# Patient Record
Sex: FEMALE | Race: WHITE | NOT HISPANIC OR LATINO
[De-identification: names, ages, dates, MRNs, and addresses within clinical notes are randomized per-mention and may not be internally consistent; named-entity substitution may affect disease eponyms.]

---

## 2019-09-18 ENCOUNTER — APPOINTMENT (OUTPATIENT)
Dept: ULTRASOUND IMAGING | Facility: IMAGING CENTER | Age: 69
End: 2019-09-18

## 2019-09-24 ENCOUNTER — APPOINTMENT (OUTPATIENT)
Dept: ULTRASOUND IMAGING | Facility: IMAGING CENTER | Age: 69
End: 2019-09-24
Payer: MEDICARE

## 2019-09-24 ENCOUNTER — OUTPATIENT (OUTPATIENT)
Dept: OUTPATIENT SERVICES | Facility: HOSPITAL | Age: 69
LOS: 1 days | End: 2019-09-24
Payer: MEDICARE

## 2019-09-24 DIAGNOSIS — Z00.8 ENCOUNTER FOR OTHER GENERAL EXAMINATION: ICD-10-CM

## 2019-09-24 PROCEDURE — 76536 US EXAM OF HEAD AND NECK: CPT | Mod: 26

## 2019-09-24 PROCEDURE — 76536 US EXAM OF HEAD AND NECK: CPT

## 2021-07-22 ENCOUNTER — APPOINTMENT (OUTPATIENT)
Dept: OTOLARYNGOLOGY | Facility: CLINIC | Age: 71
End: 2021-07-22
Payer: MEDICARE

## 2021-07-22 VITALS
HEART RATE: 79 BPM | HEIGHT: 62 IN | DIASTOLIC BLOOD PRESSURE: 78 MMHG | SYSTOLIC BLOOD PRESSURE: 147 MMHG | WEIGHT: 165 LBS | BODY MASS INDEX: 30.36 KG/M2

## 2021-07-22 DIAGNOSIS — Z86.39 PERSONAL HISTORY OF OTHER ENDOCRINE, NUTRITIONAL AND METABOLIC DISEASE: ICD-10-CM

## 2021-07-22 PROCEDURE — 99204 OFFICE O/P NEW MOD 45 MIN: CPT

## 2021-07-22 RX ORDER — SITAGLIPTIN 100 MG/1
TABLET, FILM COATED ORAL
Refills: 0 | Status: ACTIVE | COMMUNITY

## 2021-07-22 RX ORDER — METOPROLOL SUCCINATE 100 MG/1
TABLET, EXTENDED RELEASE ORAL
Refills: 0 | Status: ACTIVE | COMMUNITY

## 2021-07-22 RX ORDER — LISINOPRIL 30 MG/1
TABLET ORAL
Refills: 0 | Status: ACTIVE | COMMUNITY

## 2021-07-22 RX ORDER — METFORMIN HYDROCHLORIDE 625 MG/1
TABLET ORAL
Refills: 0 | Status: ACTIVE | COMMUNITY

## 2021-07-22 RX ORDER — ASPIRIN 325 MG/1
TABLET, FILM COATED ORAL
Refills: 0 | Status: ACTIVE | COMMUNITY

## 2021-07-22 RX ORDER — ROSUVASTATIN CALCIUM 5 MG/1
TABLET, FILM COATED ORAL
Refills: 0 | Status: ACTIVE | COMMUNITY

## 2021-07-22 RX ORDER — CHOLECALCIFEROL (VITAMIN D3) 25 MCG
TABLET ORAL
Refills: 0 | Status: ACTIVE | COMMUNITY

## 2021-07-22 NOTE — REASON FOR VISIT
[Initial Evaluation] : an initial evaluation for [Family Member] : family member [FreeTextEntry2] : referred by Dr. Leblanc, initial visit for tongue mass

## 2021-07-22 NOTE — HISTORY OF PRESENT ILLNESS
[de-identified] : 71 year old female referred by Dr. Leblanc, initial visit for tongue mass\par Reports mass at Left side of tongue, present for the past 3 years, has increased in size since\par s/p needle aspiration by PCP, Dr. Cogan, no fluid\par No biopsy or pathology\par No recent imaging studies\par Currently asymptomatic\par Denies pain, dysphagia, odynophagia, difficulty chewing, dyspnea, hemoptysis, mucus production, recent fevers, throat /oral infections\par Tolerating eating/drinking with no issues, no weight loss

## 2021-07-22 NOTE — CONSULT LETTER
[Dear  ___] : Dear  [unfilled], [Consult Letter:] : I had the pleasure of evaluating your patient, [unfilled]. [Please see my note below.] : Please see my note below. [Consult Closing:] : Thank you very much for allowing me to participate in the care of this patient.  If you have any questions, please do not hesitate to contact me. [Sincerely,] : Sincerely, [FreeTextEntry2] : Dr Jaya Clark [FreeTextEntry3] : \par Jorge L Estevez MD, FACS\par \par Otolaryngology-Head and Neck Surgery\par [employer] [facility]\par

## 2021-07-22 NOTE — PHYSICAL EXAM
[Midline] : trachea located in midline position [de-identified] : Presence of a soft, submucosal 3 cm mass  of the L anterior tongue. [Normal] : no rashes

## 2021-08-04 ENCOUNTER — APPOINTMENT (OUTPATIENT)
Dept: MRI IMAGING | Facility: HOSPITAL | Age: 71
End: 2021-08-04

## 2021-08-12 ENCOUNTER — APPOINTMENT (OUTPATIENT)
Dept: OTOLARYNGOLOGY | Facility: CLINIC | Age: 71
End: 2021-08-12
Payer: MEDICARE

## 2021-08-12 ENCOUNTER — LABORATORY RESULT (OUTPATIENT)
Age: 71
End: 2021-08-12

## 2021-08-12 VITALS
WEIGHT: 155 LBS | HEIGHT: 62 IN | BODY MASS INDEX: 28.52 KG/M2 | SYSTOLIC BLOOD PRESSURE: 114 MMHG | HEART RATE: 69 BPM | DIASTOLIC BLOOD PRESSURE: 77 MMHG

## 2021-08-12 DIAGNOSIS — E11.9 TYPE 2 DIABETES MELLITUS W/OUT COMPLICATIONS: ICD-10-CM

## 2021-08-12 DIAGNOSIS — Z92.29 PERSONAL HISTORY OF OTHER DRUG THERAPY: ICD-10-CM

## 2021-08-12 DIAGNOSIS — Z82.49 FAMILY HISTORY OF ISCHEMIC HEART DISEASE AND OTHER DISEASES OF THE CIRCULATORY SYSTEM: ICD-10-CM

## 2021-08-12 DIAGNOSIS — Z86.61 PERSONAL HISTORY OF INFECTIONS OF THE CENTRAL NERVOUS SYSTEM: ICD-10-CM

## 2021-08-12 DIAGNOSIS — I10 ESSENTIAL (PRIMARY) HYPERTENSION: ICD-10-CM

## 2021-08-12 DIAGNOSIS — E78.00 PURE HYPERCHOLESTEROLEMIA, UNSPECIFIED: ICD-10-CM

## 2021-08-12 DIAGNOSIS — Z80.0 FAMILY HISTORY OF MALIGNANT NEOPLASM OF DIGESTIVE ORGANS: ICD-10-CM

## 2021-08-12 PROCEDURE — 99214 OFFICE O/P EST MOD 30 MIN: CPT | Mod: 25

## 2021-08-12 PROCEDURE — 10021 FNA BX W/O IMG GDN 1ST LES: CPT

## 2021-08-12 NOTE — HISTORY OF PRESENT ILLNESS
[de-identified] : 71 year old female referred by Dr. Leblanc for tongue mass. \par Reports mass at Left side of tongue, present for the past 3 years, has increased in size since then. \par s/p needle aspiration by PCP, Dr. Cogan, no fluid\par No biopsy or pathology. Here today to review recent MRI. \par Denies pain, bleeding, dysphagia, odynophagia, difficulty chewing, dyspnea, hemoptysis, mucus production, recent fevers, throat /oral infections. Tolerating eating/drinking with no issues, no weight loss.\par \par MRI neck 7/30/21 ([facility]):\par limited study. pt not able to complete the study. \par

## 2021-08-12 NOTE — REASON FOR VISIT
[Subsequent Evaluation] : a subsequent evaluation for [Other: _____] : [unfilled] [FreeTextEntry2] :  tongue mass

## 2021-08-12 NOTE — CONSULT LETTER
[Dear  ___] : Dear  [unfilled], [Courtesy Letter:] : I had the pleasure of seeing your patient, [unfilled], in my office today. [Please see my note below.] : Please see my note below. [Consult Closing:] : Thank you very much for allowing me to participate in the care of this patient.  If you have any questions, please do not hesitate to contact me. [Sincerely,] : Sincerely, [FreeTextEntry2] : Dr Jaya Clark  [FreeTextEntry3] : Jorge L Estevez MD, FACS\par \par Otolaryngology-Head and Neck Surgery\par [employer] at [facility]\par

## 2021-08-12 NOTE — PHYSICAL EXAM
[Midline] : trachea located in midline position [Normal] : no rashes [de-identified] : Presence of a soft, submucosal 3 cm mass  of the L anterior tongue.

## 2021-08-20 ENCOUNTER — NON-APPOINTMENT (OUTPATIENT)
Age: 71
End: 2021-08-20

## 2021-08-24 ENCOUNTER — APPOINTMENT (OUTPATIENT)
Dept: MRI IMAGING | Facility: IMAGING CENTER | Age: 71
End: 2021-08-24

## 2021-09-22 ENCOUNTER — OUTPATIENT (OUTPATIENT)
Dept: OUTPATIENT SERVICES | Facility: HOSPITAL | Age: 71
LOS: 1 days | End: 2021-09-22
Payer: MEDICARE

## 2021-09-22 VITALS
RESPIRATION RATE: 16 BRPM | OXYGEN SATURATION: 98 % | HEART RATE: 66 BPM | HEIGHT: 62 IN | SYSTOLIC BLOOD PRESSURE: 130 MMHG | WEIGHT: 177.91 LBS | TEMPERATURE: 98 F | DIASTOLIC BLOOD PRESSURE: 76 MMHG

## 2021-09-22 DIAGNOSIS — K14.8 OTHER DISEASES OF TONGUE: ICD-10-CM

## 2021-09-22 DIAGNOSIS — Z01.812 ENCOUNTER FOR PREPROCEDURAL LABORATORY EXAMINATION: ICD-10-CM

## 2021-09-22 DIAGNOSIS — E11.9 TYPE 2 DIABETES MELLITUS WITHOUT COMPLICATIONS: ICD-10-CM

## 2021-09-22 DIAGNOSIS — H26.9 UNSPECIFIED CATARACT: Chronic | ICD-10-CM

## 2021-09-22 DIAGNOSIS — I10 ESSENTIAL (PRIMARY) HYPERTENSION: ICD-10-CM

## 2021-09-22 LAB
A1C WITH ESTIMATED AVERAGE GLUCOSE RESULT: 5.3 % — SIGNIFICANT CHANGE UP (ref 4–5.6)
ANION GAP SERPL CALC-SCNC: 14 MMOL/L — SIGNIFICANT CHANGE UP (ref 7–14)
BLD GP AB SCN SERPL QL: NEGATIVE — SIGNIFICANT CHANGE UP
BUN SERPL-MCNC: 21 MG/DL — SIGNIFICANT CHANGE UP (ref 7–23)
CALCIUM SERPL-MCNC: 9.2 MG/DL — SIGNIFICANT CHANGE UP (ref 8.4–10.5)
CHLORIDE SERPL-SCNC: 106 MMOL/L — SIGNIFICANT CHANGE UP (ref 98–107)
CO2 SERPL-SCNC: 19 MMOL/L — LOW (ref 22–31)
CREAT SERPL-MCNC: 0.69 MG/DL — SIGNIFICANT CHANGE UP (ref 0.5–1.3)
ESTIMATED AVERAGE GLUCOSE: 105 — SIGNIFICANT CHANGE UP
GLUCOSE SERPL-MCNC: 99 MG/DL — SIGNIFICANT CHANGE UP (ref 70–99)
HCT VFR BLD CALC: 39.1 % — SIGNIFICANT CHANGE UP (ref 34.5–45)
HGB BLD-MCNC: 12.9 G/DL — SIGNIFICANT CHANGE UP (ref 11.5–15.5)
MCHC RBC-ENTMCNC: 30.4 PG — SIGNIFICANT CHANGE UP (ref 27–34)
MCHC RBC-ENTMCNC: 33 GM/DL — SIGNIFICANT CHANGE UP (ref 32–36)
MCV RBC AUTO: 92 FL — SIGNIFICANT CHANGE UP (ref 80–100)
NRBC # BLD: 0 /100 WBCS — SIGNIFICANT CHANGE UP
NRBC # FLD: 0 K/UL — SIGNIFICANT CHANGE UP
PLATELET # BLD AUTO: 269 K/UL — SIGNIFICANT CHANGE UP (ref 150–400)
POTASSIUM SERPL-MCNC: 4.1 MMOL/L — SIGNIFICANT CHANGE UP (ref 3.5–5.3)
POTASSIUM SERPL-SCNC: 4.1 MMOL/L — SIGNIFICANT CHANGE UP (ref 3.5–5.3)
RBC # BLD: 4.25 M/UL — SIGNIFICANT CHANGE UP (ref 3.8–5.2)
RBC # FLD: 13.9 % — SIGNIFICANT CHANGE UP (ref 10.3–14.5)
RH IG SCN BLD-IMP: POSITIVE — SIGNIFICANT CHANGE UP
SODIUM SERPL-SCNC: 139 MMOL/L — SIGNIFICANT CHANGE UP (ref 135–145)
WBC # BLD: 6.83 K/UL — SIGNIFICANT CHANGE UP (ref 3.8–10.5)
WBC # FLD AUTO: 6.83 K/UL — SIGNIFICANT CHANGE UP (ref 3.8–10.5)

## 2021-09-22 PROCEDURE — 93010 ELECTROCARDIOGRAM REPORT: CPT

## 2021-09-22 NOTE — H&P PST ADULT - PROBLEM SELECTOR PLAN 1
Scheduled for left partial glossectomy on 10/1/2021  Written & verbal preop instructions, gi prophylaxis  Pt verbalized good understanding.  medical eval request by surgeon & PST - age and comorbidities  Pending copy of report

## 2021-09-22 NOTE — H&P PST ADULT - NSICDXPASTMEDICALHX_GEN_ALL_CORE_FT
PAST MEDICAL HISTORY:  Cholelithiasis     History of meningitis as a child    Mild mental retardation     Other diseases of tongue     Strabismus      PAST MEDICAL HISTORY:  Cholelithiasis     History of meningitis as a child    Mild mental retardation     Obesity     Other diseases of tongue     Strabismus

## 2021-09-22 NOTE — H&P PST ADULT - NSICDXFAMILYHX_GEN_ALL_CORE_FT
FAMILY HISTORY:  FH: borderline diabetes    Mother  Still living? No  FH: HTN (hypertension), Age at diagnosis: Age Unknown

## 2021-09-22 NOTE — H&P PST ADULT - NSANTHOSAYNRD_GEN_A_CORE
No. CHAUNCEY screening performed.  STOP BANG Legend: 0-2 = LOW Risk; 3-4 = INTERMEDIATE Risk; 5-8 = HIGH Risk

## 2021-09-22 NOTE — H&P PST ADULT - HISTORY OF PRESENT ILLNESS
70y/o female presents for preop eval for scheduled left partial glossectomy.  Pt sister  states, pt had tongue lesion for a few years and it has gradually increased in size, recent biopsy negative.

## 2021-09-27 DIAGNOSIS — Z01.818 ENCOUNTER FOR OTHER PREPROCEDURAL EXAMINATION: ICD-10-CM

## 2021-09-28 ENCOUNTER — APPOINTMENT (OUTPATIENT)
Dept: DISASTER EMERGENCY | Facility: CLINIC | Age: 71
End: 2021-09-28

## 2021-09-29 LAB — SARS-COV-2 N GENE NPH QL NAA+PROBE: NOT DETECTED

## 2021-09-30 ENCOUNTER — TRANSCRIPTION ENCOUNTER (OUTPATIENT)
Age: 71
End: 2021-09-30

## 2021-09-30 NOTE — ASU PATIENT PROFILE, ADULT - NSICDXPASTMEDICALHX_GEN_ALL_CORE_FT
PAST MEDICAL HISTORY:  Cholelithiasis     History of meningitis as a child    Mild mental retardation     Obesity     Other diseases of tongue     Strabismus

## 2021-09-30 NOTE — ASU PATIENT PROFILE, ADULT - NS PRO ABUSE SCREEN AFRAID ANYONE YN
Anesthesia Pre Eval Note    Anesthesia ROS/Med Hx          Pulmonary Review:  Patient does not have a pulmonary history      Neuro/Psych Review:  Patient does not have a neuro/psych history       Cardiovascular Review:  Patient does not have a cardiovascular history       GI/HEPATIC/RENAL Review:  Patient does not have a GI/hepatic/renalhistory       End/Other Review:  Patient does not have an endo/other history        Relevant Problems   No relevant active problems       Physical Exam     Airway   Mallampati: II  TM Distance: >3 FB  Neck ROM: Full    Cardiovascular  Cardiovascular exam normal    Pulmonary Exam  Pulmonary exam normal    Abdominal Exam  Abdominal exam normal      Anesthesia Plan    ASA Status: 1    Anesthesia Type: MAC        Informed Consent  The proposed anesthetic plan, including its risks and benefits, have been discussed with the Patient  - along with the risks and benefits of alternatives.  Questions were encouraged and answered and the patient and/or representative understands and agrees to proceed.        no

## 2021-10-01 ENCOUNTER — APPOINTMENT (OUTPATIENT)
Dept: OTOLARYNGOLOGY | Facility: HOSPITAL | Age: 71
End: 2021-10-01

## 2021-10-01 ENCOUNTER — OUTPATIENT (OUTPATIENT)
Dept: OUTPATIENT SERVICES | Facility: HOSPITAL | Age: 71
LOS: 1 days | Discharge: HOME | End: 2021-10-01
Payer: MEDICARE

## 2021-10-01 ENCOUNTER — RESULT REVIEW (OUTPATIENT)
Age: 71
End: 2021-10-01

## 2021-10-01 VITALS
HEIGHT: 62 IN | TEMPERATURE: 99 F | RESPIRATION RATE: 14 BRPM | OXYGEN SATURATION: 95 % | DIASTOLIC BLOOD PRESSURE: 67 MMHG | SYSTOLIC BLOOD PRESSURE: 124 MMHG | WEIGHT: 177.91 LBS | HEART RATE: 69 BPM

## 2021-10-01 VITALS
OXYGEN SATURATION: 100 % | SYSTOLIC BLOOD PRESSURE: 106 MMHG | DIASTOLIC BLOOD PRESSURE: 57 MMHG | HEART RATE: 68 BPM | RESPIRATION RATE: 14 BRPM

## 2021-10-01 DIAGNOSIS — H26.9 UNSPECIFIED CATARACT: Chronic | ICD-10-CM

## 2021-10-01 DIAGNOSIS — K14.8 OTHER DISEASES OF TONGUE: ICD-10-CM

## 2021-10-01 PROCEDURE — 88305 TISSUE EXAM BY PATHOLOGIST: CPT | Mod: 26

## 2021-10-01 PROCEDURE — 41120 PARTIAL REMOVAL OF TONGUE: CPT

## 2021-10-01 RX ORDER — OXYCODONE HYDROCHLORIDE 5 MG/1
5 TABLET ORAL ONCE
Refills: 0 | Status: DISCONTINUED | OUTPATIENT
Start: 2021-10-01 | End: 2021-10-01

## 2021-10-01 RX ORDER — OXYCODONE HYDROCHLORIDE 5 MG/1
1 TABLET ORAL
Qty: 12 | Refills: 0
Start: 2021-10-01 | End: 2021-10-05

## 2021-10-01 RX ORDER — ONDANSETRON 8 MG/1
4 TABLET, FILM COATED ORAL ONCE
Refills: 0 | Status: DISCONTINUED | OUTPATIENT
Start: 2021-10-01 | End: 2021-10-15

## 2021-10-01 RX ORDER — FENTANYL CITRATE 50 UG/ML
25 INJECTION INTRAVENOUS
Refills: 0 | Status: DISCONTINUED | OUTPATIENT
Start: 2021-10-01 | End: 2021-10-01

## 2021-10-01 NOTE — ASU DISCHARGE PLAN (ADULT/PEDIATRIC) - CARE PROVIDER_API CALL
Jorge L Estevez)  Otolaryngology  [address] [place] [address]  Phone: [phone]  Fax: [phone]  Follow Up Time: 1 week

## 2021-10-01 NOTE — ASU DISCHARGE PLAN (ADULT/PEDIATRIC) - NURSING INSTRUCTIONS
Do not take pain medication on an empty stomach.  Increase fluids and fiber in diet to prevent constipation. no tylenol or acetominophen until after 6:15pm today

## 2021-10-01 NOTE — ASU DISCHARGE PLAN (ADULT/PEDIATRIC) - CALL YOUR DOCTOR IF YOU HAVE ANY OF THE FOLLOWING:
Bleeding that does not stop/Swelling that gets worse/Pain not relieved by Medications/Wound/Surgical Site with redness, or foul smelling discharge or pus/Numbness, tingling, color or temperature change to extremity Bleeding that does not stop/Swelling that gets worse/Pain not relieved by Medications/Fever greater than (need to indicate Fahrenheit or Celsius)/Wound/Surgical Site with redness, or foul smelling discharge or pus/Numbness, tingling, color or temperature change to extremity/Nausea and vomiting that does not stop

## 2021-10-01 NOTE — ASU PREOP CHECKLIST - 2.
pt's first language is Korean, pt does not read or write Korean or English.  She understands spoken English and she knows why she is here.  She is able to make her abbi on consent.  Sister is HCP

## 2021-10-01 NOTE — ASU DISCHARGE PLAN (ADULT/PEDIATRIC) - ASU DC SPECIAL INSTRUCTIONSFT
Please take over the counter medications for moderate pain and oxycodone as needed for severe pain.    Please call to see Dr. Estevez in 1-2 weeks for follow-up.

## 2021-10-04 LAB — GLUCOSE BLDC GLUCOMTR-MCNC: 113 MG/DL — HIGH (ref 70–99)

## 2021-10-05 LAB — SURGICAL PATHOLOGY STUDY: SIGNIFICANT CHANGE UP

## 2021-10-07 PROBLEM — K14.8 OTHER DISEASES OF TONGUE: Chronic | Status: ACTIVE | Noted: 2021-09-22

## 2021-10-07 PROBLEM — E66.9 OBESITY, UNSPECIFIED: Chronic | Status: ACTIVE | Noted: 2021-09-22

## 2021-10-07 PROBLEM — H50.9 UNSPECIFIED STRABISMUS: Chronic | Status: ACTIVE | Noted: 2021-09-22

## 2021-10-21 ENCOUNTER — APPOINTMENT (OUTPATIENT)
Dept: OTOLARYNGOLOGY | Facility: CLINIC | Age: 71
End: 2021-10-21
Payer: MEDICARE

## 2021-10-21 VITALS
HEIGHT: 62 IN | DIASTOLIC BLOOD PRESSURE: 73 MMHG | SYSTOLIC BLOOD PRESSURE: 108 MMHG | BODY MASS INDEX: 28.52 KG/M2 | WEIGHT: 155 LBS | HEART RATE: 66 BPM

## 2021-10-21 DIAGNOSIS — K14.8 OTHER DISEASES OF TONGUE: ICD-10-CM

## 2021-10-21 PROCEDURE — 99024 POSTOP FOLLOW-UP VISIT: CPT

## 2021-10-21 NOTE — HISTORY OF PRESENT ILLNESS
[de-identified] : 71 year old female referred by Dr. Leblanc for tongue mass. \par History of mass at Left side of tongue, present for the past 3 years, has increased in size since then. \par s/p needle aspiration by PCP, Dr. 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 Denies pain, bleeding, dysphagia, odynophagia, dyspnea, hemoptysis, mucus production, recent fevers, throat /oral infections. Tolerating eating/drinking, no weight loss.\par \par MRI neck 7/30/21 ([facility]):\par limited study. pt not able to complete the study. \par

## 2021-10-21 NOTE — PHYSICAL EXAM
[Midline] : trachea located in midline position [de-identified] : Tongue healing well. [Normal] : no rashes

## 2021-10-21 NOTE — CONSULT LETTER
[Dear  ___] : Dear  [unfilled], [Courtesy Letter:] : I had the pleasure of seeing your patient, [unfilled], in my office today. [Please see my note below.] : Please see my note below. [Consult Closing:] : Thank you very much for allowing me to participate in the care of this patient.  If you have any questions, please do not hesitate to contact me. [Sincerely,] : Sincerely, [FreeTextEntry2] : Dr Jaya Clark  [FreeTextEntry3] : \par Jorge L Estevez MD, FACS\par \par Otolaryngology-Head and Neck Surgery\par [employer] at [facility]\par

## 2023-01-28 ENCOUNTER — INPATIENT (INPATIENT)
Facility: HOSPITAL | Age: 73
LOS: 1 days | Discharge: HOME | End: 2023-01-30
Attending: STUDENT IN AN ORGANIZED HEALTH CARE EDUCATION/TRAINING PROGRAM | Admitting: STUDENT IN AN ORGANIZED HEALTH CARE EDUCATION/TRAINING PROGRAM
Payer: MEDICARE

## 2023-01-28 VITALS
DIASTOLIC BLOOD PRESSURE: 56 MMHG | HEART RATE: 102 BPM | RESPIRATION RATE: 18 BRPM | TEMPERATURE: 90 F | OXYGEN SATURATION: 100 % | SYSTOLIC BLOOD PRESSURE: 104 MMHG

## 2023-01-28 DIAGNOSIS — H26.9 UNSPECIFIED CATARACT: Chronic | ICD-10-CM

## 2023-01-28 LAB
ALBUMIN SERPL ELPH-MCNC: 4 G/DL — SIGNIFICANT CHANGE UP (ref 3.3–5)
ALP SERPL-CCNC: 86 U/L — SIGNIFICANT CHANGE UP (ref 40–120)
ALT FLD-CCNC: 22 U/L — SIGNIFICANT CHANGE UP (ref 4–33)
ANION GAP SERPL CALC-SCNC: 17 MMOL/L — HIGH (ref 7–14)
APTT BLD: 23.1 SEC — LOW (ref 27–36.3)
AST SERPL-CCNC: 22 U/L — SIGNIFICANT CHANGE UP (ref 4–32)
BASE EXCESS BLDV CALC-SCNC: -7.3 MMOL/L — LOW (ref -2–3)
BASOPHILS # BLD AUTO: 0.03 K/UL — SIGNIFICANT CHANGE UP (ref 0–0.2)
BASOPHILS NFR BLD AUTO: 0.2 % — SIGNIFICANT CHANGE UP (ref 0–2)
BILIRUB SERPL-MCNC: 0.6 MG/DL — SIGNIFICANT CHANGE UP (ref 0.2–1.2)
BUN SERPL-MCNC: 40 MG/DL — HIGH (ref 7–23)
CA-I SERPL-SCNC: 1.18 MMOL/L — SIGNIFICANT CHANGE UP (ref 1.15–1.33)
CALCIUM SERPL-MCNC: 9.4 MG/DL — SIGNIFICANT CHANGE UP (ref 8.4–10.5)
CHLORIDE BLDV-SCNC: 104 MMOL/L — SIGNIFICANT CHANGE UP (ref 96–108)
CHLORIDE SERPL-SCNC: 106 MMOL/L — SIGNIFICANT CHANGE UP (ref 98–107)
CO2 BLDV-SCNC: 22 MMOL/L — SIGNIFICANT CHANGE UP (ref 22–26)
CO2 SERPL-SCNC: 14 MMOL/L — LOW (ref 22–31)
CREAT SERPL-MCNC: 1.15 MG/DL — SIGNIFICANT CHANGE UP (ref 0.5–1.3)
EGFR: 50 ML/MIN/1.73M2 — LOW
EOSINOPHIL # BLD AUTO: 0 K/UL — SIGNIFICANT CHANGE UP (ref 0–0.5)
EOSINOPHIL NFR BLD AUTO: 0 % — SIGNIFICANT CHANGE UP (ref 0–6)
FLUAV AG NPH QL: SIGNIFICANT CHANGE UP
FLUBV AG NPH QL: SIGNIFICANT CHANGE UP
GAS PNL BLDV: 135 MMOL/L — LOW (ref 136–145)
GAS PNL BLDV: SIGNIFICANT CHANGE UP
GAS PNL BLDV: SIGNIFICANT CHANGE UP
GLUCOSE BLDV-MCNC: 135 MG/DL — HIGH (ref 70–99)
GLUCOSE SERPL-MCNC: 139 MG/DL — HIGH (ref 70–99)
HCO3 BLDV-SCNC: 20 MMOL/L — LOW (ref 22–29)
HCT VFR BLD CALC: 43.7 % — SIGNIFICANT CHANGE UP (ref 34.5–45)
HCT VFR BLDA CALC: 43 % — SIGNIFICANT CHANGE UP (ref 34.5–46.5)
HGB BLD CALC-MCNC: 14.4 G/DL — SIGNIFICANT CHANGE UP (ref 11.5–15.5)
HGB BLD-MCNC: 14 G/DL — SIGNIFICANT CHANGE UP (ref 11.5–15.5)
IANC: 17.18 K/UL — HIGH (ref 1.8–7.4)
IMM GRANULOCYTES NFR BLD AUTO: 0.7 % — SIGNIFICANT CHANGE UP (ref 0–0.9)
INR BLD: 1.09 RATIO — SIGNIFICANT CHANGE UP (ref 0.88–1.16)
LACTATE BLDV-MCNC: 3.4 MMOL/L — HIGH (ref 0.5–2)
LYMPHOCYTES # BLD AUTO: 1.06 K/UL — SIGNIFICANT CHANGE UP (ref 1–3.3)
LYMPHOCYTES # BLD AUTO: 5.5 % — LOW (ref 13–44)
MCHC RBC-ENTMCNC: 29.2 PG — SIGNIFICANT CHANGE UP (ref 27–34)
MCHC RBC-ENTMCNC: 32 GM/DL — SIGNIFICANT CHANGE UP (ref 32–36)
MCV RBC AUTO: 91.2 FL — SIGNIFICANT CHANGE UP (ref 80–100)
MONOCYTES # BLD AUTO: 0.8 K/UL — SIGNIFICANT CHANGE UP (ref 0–0.9)
MONOCYTES NFR BLD AUTO: 4.2 % — SIGNIFICANT CHANGE UP (ref 2–14)
NEUTROPHILS # BLD AUTO: 17.18 K/UL — HIGH (ref 1.8–7.4)
NEUTROPHILS NFR BLD AUTO: 89.4 % — HIGH (ref 43–77)
NRBC # BLD: 0 /100 WBCS — SIGNIFICANT CHANGE UP (ref 0–0)
NRBC # FLD: 0 K/UL — SIGNIFICANT CHANGE UP (ref 0–0)
PCO2 BLDV: 49 MMHG — HIGH (ref 39–42)
PH BLDV: 7.23 — LOW (ref 7.32–7.43)
PLATELET # BLD AUTO: 350 K/UL — SIGNIFICANT CHANGE UP (ref 150–400)
PO2 BLDV: 24 MMHG — SIGNIFICANT CHANGE UP
POTASSIUM BLDV-SCNC: 5.1 MMOL/L — SIGNIFICANT CHANGE UP (ref 3.5–5.1)
POTASSIUM SERPL-MCNC: 5.7 MMOL/L — HIGH (ref 3.5–5.3)
POTASSIUM SERPL-SCNC: 5.7 MMOL/L — HIGH (ref 3.5–5.3)
PROT SERPL-MCNC: 7.1 G/DL — SIGNIFICANT CHANGE UP (ref 6–8.3)
PROTHROM AB SERPL-ACNC: 12.7 SEC — SIGNIFICANT CHANGE UP (ref 10.5–13.4)
RBC # BLD: 4.79 M/UL — SIGNIFICANT CHANGE UP (ref 3.8–5.2)
RBC # FLD: 13.1 % — SIGNIFICANT CHANGE UP (ref 10.3–14.5)
RSV RNA NPH QL NAA+NON-PROBE: SIGNIFICANT CHANGE UP
SAO2 % BLDV: 29.3 % — SIGNIFICANT CHANGE UP
SARS-COV-2 RNA SPEC QL NAA+PROBE: SIGNIFICANT CHANGE UP
SODIUM SERPL-SCNC: 137 MMOL/L — SIGNIFICANT CHANGE UP (ref 135–145)
TROPONIN T, HIGH SENSITIVITY RESULT: 41 NG/L — SIGNIFICANT CHANGE UP
WBC # BLD: 19.21 K/UL — HIGH (ref 3.8–10.5)
WBC # FLD AUTO: 19.21 K/UL — HIGH (ref 3.8–10.5)

## 2023-01-28 PROCEDURE — 70496 CT ANGIOGRAPHY HEAD: CPT | Mod: 26,MA

## 2023-01-28 PROCEDURE — 0042T: CPT

## 2023-01-28 PROCEDURE — 71045 X-RAY EXAM CHEST 1 VIEW: CPT | Mod: 26

## 2023-01-28 PROCEDURE — 70498 CT ANGIOGRAPHY NECK: CPT | Mod: 26,MA

## 2023-01-28 PROCEDURE — 70450 CT HEAD/BRAIN W/O DYE: CPT | Mod: 26,59,MA

## 2023-01-28 PROCEDURE — 99285 EMERGENCY DEPT VISIT HI MDM: CPT | Mod: GC

## 2023-01-28 RX ORDER — SODIUM CHLORIDE 9 MG/ML
1000 INJECTION INTRAMUSCULAR; INTRAVENOUS; SUBCUTANEOUS ONCE
Refills: 0 | Status: COMPLETED | OUTPATIENT
Start: 2023-01-28 | End: 2023-01-28

## 2023-01-28 RX ORDER — HALOPERIDOL DECANOATE 100 MG/ML
2.5 INJECTION INTRAMUSCULAR ONCE
Refills: 0 | Status: COMPLETED | OUTPATIENT
Start: 2023-01-28 | End: 2023-01-28

## 2023-01-28 RX ADMIN — SODIUM CHLORIDE 1000 MILLILITER(S): 9 INJECTION INTRAMUSCULAR; INTRAVENOUS; SUBCUTANEOUS at 22:53

## 2023-01-28 NOTE — ED PROVIDER NOTE - PHYSICAL EXAMINATION
Gen: Alert. Ox3. Appearing weak.   HEENT: Atraumatic. Mucous membranes moist.  CV: RRR. No significant LE edema.   Resp: Unlabored-respirations. CTAB.  GI: Abdomen non tender to palpation, soft.  Skin/MSK: No open wounds.   Neuro: EOMI. Pupils ERRL. Following commands. CN2-12 grossly intact w/ exception of L eye exotropia. Motor strength +5/5 throughout upper and lower extremities. Sensation intact throughout upper and lower extremities. No pronator drift. Exam otherwise limited due to poor compliance w/ examination and requesting to return home.   Psych: Appropriate mood, cooperative

## 2023-01-28 NOTE — CONSULT NOTE ADULT - ASSESSMENT
JF RAMIREZ is a 72y (1950) woman with a PMHx significant for HTN, HLD, DM2 presenting as a code stroke LKW 1/28 9:30 AM reported by nephew for AMS. Patient was found in the evening by EMS wandering the streets, confused, and reportedly having speech difficulty. Of note, patient speaks Austrian. EMS used a  at the scene and patient was reportedly confused and speech was not obviously clear. Nephew at bedside who does not live with patient says patient lives with aunt and patient has no hx of stroke and has not had a prior episode of confusion before. On neurological exam, patient has no focal deficits. Using Austrian  [id number], patient was able to repeat phrases and per , patient's speech was almost fluent. 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 Patient takes a baby asa.     Labs s/f for leukocytosis of 19 with left shift, lactate 3.4. Temperature 90F. Neuroimaging with no acute findings.     NIHSS:3  preMRS:2    Not a tenecteplase candidate given outside time window.   Not a thrombectomy candidate given no LVO.     Impression: Acute onset confusion likely iso toxic metabolic infectious etiology. Low suspicion for acute infarct.     Recommendations:   [] check UA, Utox, ETOH level, TSH, T3/T4, RPR, antithyroglobulin Ab, TPO Ab, vitamin B1, B6, B12, folate, homocysteine, methylmalonic acid, lactate, creatnine kinase, ammonia, Cu, ceruloplasmin, SPEP, HIV, ESR, CRP, Zn  []Rest of work up per ED  []C/w ASA 81 mg daily     Case to be seen by general neurology attending. Case d/w stroke attending Dr. Katie Begum.

## 2023-01-28 NOTE — ED PROVIDER NOTE - CLINICAL SUMMARY MEDICAL DECISION MAKING FREE TEXT BOX
71 yo F PMHx HTN, HLD, NIDDM, Presenting to ED for altered mental status, last known normal at 9 AM when leaving apartment to go to the store.  Patient subsequently found wandering approximately 30 minutes prior to ED arrival.  On scene patient appeared to be confused with Welsh .  Patient currently living independently with family living complex nearby.  Patient's sister reported last known normal at 9 AM.  Patient ambulatory on EMS arrival where patient was found in street.  No history of stroke.  Patient with complaints of generalized weakness.  Presently requesting to return home.  Patient largely noncompliant with history taking.  Patient not taking anticoagulation per family.  Family denies other recent significant past medical history or recent infectious symptoms.  Patient denies pain in abdomen chest or head.  No history of similar episodes. CODE STROKE called. Exam as above.  Consider CVA, subdural, metabolic etiology for presentation, electrolyte abnormality, glucose abnormality, hypovolemia/dehydration, NELLA, occult infection, medication side effect, anemia. Consider CHF, gastroenteritis, UTI. Labs, CT imaging, cxr, ecg, will reassess. 73 yo F PMHx HTN, HLD, NIDDM, Presenting to ED for altered mental status, last known normal at 9 AM when leaving apartment to go to the store.  Patient subsequently found wandering approximately 30 minutes prior to ED arrival.  On scene patient appeared to be confused with Belarusian .  Patient currently living independently with family living complex nearby.  Patient's sister reported last known normal at 9 AM.  Patient ambulatory on EMS arrival where patient was found in street.  No history of stroke.  Patient with complaints of generalized weakness.  Presently requesting to return home.  Patient largely noncompliant with history taking.  Patient not taking anticoagulation per family.  Family denies other recent significant past medical history or recent infectious symptoms.  Patient denies pain in abdomen chest or head.  No history of similar episodes. CODE STROKE called. Exam as above.  Consider CVA, subdural, metabolic etiology for presentation, electrolyte abnormality, glucose abnormality, hypovolemia/dehydration, NELLA, occult infection, medication side effect, anemia. Consider CHF, gastroenteritis, UTI. Labs, CT imaging, cxr, ecg, will reassess.    Nazanin Benitez MD attending physician the patient is a 72-year-old woman who was called by a stroke code.  She lives in [place] and was found walking on [address] by [facility].  Baseline she lives home alone but is cared for by family who visit multiple times a day.  I spoke with her PMD after her family member Cristhian gave me the phone call and he said that she has some baseline dementia but normally functions within a very limited sphere of walking outside going to the store and coming back.  Using an Belarusian  the patient was speaking nonsense words and not really answering.  When we try to get her on the CAT scan table though she kept saying "I want to go home."  Cristhian was able to calm her down to have her lay down for the CT for the stroke code.  Patient is a diabetic on metformin.  Her glucose was okay in trauma bay.  She is also hypertensive.  She is on no anticoagulation.  CT does not look like an acute bleed mass shift.  She has a white count of 19,000.  Presumably she has infection and we are currently in search of infection.  Patient awake and alert.  Has a chronic left sided eye droop.  Is able to stand and bear her weight.  Abdomen soft.

## 2023-01-28 NOTE — ED ADULT NURSE NOTE - EXTENSIONS OF SELF_ADULT
----- Message from Grayson Horner MD sent at 3/23/2022  1:39 PM CDT -----  Shruthi Queen.    Let's increase to either Rosuvastatin 2.5 mg daily or even 5 mg daily.    Then, repeat a FASTING lipid panel in 6 weeks.    Thanks.      Component      Latest Ref Rng & Units 12/1/2020 12/7/2021 3/23/2022   Fasting Status      0 - 999 Hours 12 12 12   CHOLESTEROL      <=199 mg/dL 155 210 (H) 182   TRIGLYCERIDE      <=149 mg/dL 74 99 120   HDL      >=40 mg/dL 51 43 45   CALCULATED LDL      <=129 mg/dL 89 147 (H) 113   CALCULATED NON HDL      mg/dL 104 167 137   CHOL/HDL      <=4.4 3.0 4.9 (H) 4.0     
Spoke with patient.  Reviewed lab results and MD recommendations below.   Patient verbalized understanding.   States it is hard to cut pills in half.   He will try to take 1 full tab every other day rather than 2.5 mg daily.     New Rx sent to pharmacy and labs ordered for 6 weeks.   No further questions at this time.   
None

## 2023-01-28 NOTE — ED PROVIDER NOTE - RAPID ASSESSMENT
As per sister patient's last known normal was 9 at which point they had visited each other.  She normally has clear speech and is able to take care of herself by food for herself and cook etc.  The patient lives alone.  As per EMS she was found wandering and confused in the parking lot of a store.  When they used an French  she was giving only nonsensical answers and very confused.  This appeared to be an acute change in her baseline.  Of note has outward gaze deviated left eye which is chronic as per sister.  Stroke code initiated given within 24 hours.    (Rico Win MD; attending emergency medicine and medical toxicology)

## 2023-01-28 NOTE — ED ADULT TRIAGE NOTE - CHIEF COMPLAINT QUOTE
arrives confused walking in a residential lot bystander called EMS TeMP UnaBle in triage showed 90.0 F oral Dr Win called Code stroKe

## 2023-01-28 NOTE — ED PROVIDER NOTE - OBJECTIVE STATEMENT
73 yo F PMHx HTN, HLD, NIDDM, Presenting to ED for altered mental status, last known normal at 9 AM when leaving apartment to go to the store.  Patient subsequently found wandering approximately 30 minutes prior to ED arrival.  On scene patient appeared to be confused with Amharic .  Patient currently living independently with family living complex nearby.  Patient's sister reported last known normal at 9 AM.  Patient ambulatory on EMS arrival where patient was found in street.  No history of stroke.  Patient with complaints of generalized weakness.  Presently requesting to return home.  Patient largely noncompliant with history taking.  Patient not taking anticoagulation per family.  Family denies other recent significant past medical history or recent infectious symptoms.  Patient denies pain in abdomen chest or head.  No history of similar episodes. CODE STROKE called.    Amharic  [id number]

## 2023-01-28 NOTE — STROKE CODE NOTE - ASSESSMENT/PLAN
HISTORY OF PRESENT ILLNESS:  72y Female with stroke risk factors of (HTN, DM) evaluated at [facility] ED with confusion & dysarthria Last known normal time was (930am).    OUTSIDE HOSPITAL COURSE:    PAST MEDICAL HISTORY:  FH: borderline diabetes    FH: HTN (hypertension) (Mother)    History of meningitis    Mild mental retardation    Cholelithiasis    Other diseases of tongue    Strabismus    Obesity    Hx of cholecystectomy    Cataract    CONFUSION    90+    SysAdmin_VisitLink        PAST SURGICAL HISTORY:    HOME MEDICATIONS:  Home Medications:  aspirin 81 mg oral tablet: orally once a day last dose to be 09/24/2021 (01 Oct 2021 09:00)  Januvia 100 mg oral tablet: 1 tab(s) orally once a day (01 Oct 2021 09:00)  lisinopril 40 mg oral tablet: 1 tab(s) orally once a day (01 Oct 2021 09:00)  metFORMIN 500 mg oral tablet: 1 tab(s) orally 2 times a day (01 Oct 2021 09:00)  rosuvastatin 5 mg oral capsule: 1 cap(s) orally once a day pm (01 Oct 2021 09:00)  Toprol-XL 25 mg oral tablet, extended release: 1 tab(s) orally once a day  (01 Oct 2021 09:00)  Vitamin D3: 1 tab(s) orally once a day (01 Oct 2021 09:00)      FAMILY HISTORY:    SOCIAL HISTORY:    ALLERGIES:  No Known Allergies      VITALS/DATA/ORDERS: [x] Reviewed  Vital Signs Last 24 Hrs  T(C): 32.2 (28 Jan 2023 19:57), Max: 32.2 (28 Jan 2023 19:57)  T(F): 90 (28 Jan 2023 19:57), Max: 90 (28 Jan 2023 19:57)  HR: 102 (28 Jan 2023 19:57) (102 - 102)  BP: 104/56 (28 Jan 2023 19:57) (104/56 - 104/56)  BP(mean): --  RR: 18 (28 Jan 2023 19:57) (18 - 18)  SpO2: 100% (28 Jan 2023 19:57) (100% - 100%)                            14.0   19.21 )-----------( 350      ( 28 Jan 2023 20:23 )             43.7     01-28    137  |  106  |  40<H>  ----------------------------<  139<H>  5.7<H>   |  14<L>  |  1.15    Ca    9.4      28 Jan 2023 20:23    TPro  7.1  /  Alb  4.0  /  TBili  0.6  /  DBili  x   /  AST  22  /  ALT  22  /  AlkPhos  86  01-28    PT/INR - ( 28 Jan 2023 20:23 )   PT: 12.7 sec;   INR: 1.09 ratio         PTT - ( 28 Jan 2023 20:23 )  PTT:23.1 sec  CAPILLARY BLOOD GLUCOSE  134 (28 Jan 2023 20:18)      POCT Blood Glucose.: 134 mg/dL (28 Jan 2023 19:56)      RECOMMENDATIONS:  symptoms and vital signs suggest other aitiology for encephalopathy not ischemic stroke, CTH and CTA negative.   not TPA(out of window) or thrombectomy candidate   please workup for metabolic causes  reconsult if change in neurological status.      Plan discussed with Neurology resident Page Vidal

## 2023-01-28 NOTE — ED ADULT NURSE NOTE - OBJECTIVE STATEMENT
Facilitator RN: Patient presents to ED as stroke notification from home. As per EMS, patient was found wandering in apartment complex and speech is garbled, slurred. Baseline, she lives alone and is independent. At present patient is AA&O to person, place, but not time. As per information from family endorsed to MD and EMS, she has left sided facial droop baseline. Speech continues to be garbled and slurred. No emeli. arm and emeli. lower extremities weakness. Respirations even, unlabored on room air. Denies pain, CP, dizziness, SOB, dyspnea, N/V. Hx. cholelithiasis, cholecystectomy, mild retardation. 20 G IV placed left AC, labs drawn and sent. Pending CT.

## 2023-01-28 NOTE — CONSULT NOTE ADULT - ATTENDING COMMENTS
Patient JF RAMIREZ is a 72y (1950) woman with a PMHx significant for HTN, HLD, DM2 presenting as a code stroke LKW 1/28 9:30 AM reported by nephew for AMS. Patient was found in the evening by EMS wandering the streets, confused, and reportedly having speech difficulty. Of note, patient speaks Hebrew. EMS used a  at the scene and patient was reportedly confused and speech was not obviously clear.    Most likelt toxic metabolic cause

## 2023-01-28 NOTE — ED PROVIDER NOTE - ATTENDING CONTRIBUTION TO CARE
Nazanin Benitez MD attending physician the patient is a 72-year-old woman who was called by a stroke code.  She lives in [place] and was found walking on [address] by [facility].  Baseline she lives home alone but is cared for by family who visit multiple times a day.  I spoke with her PMD after her family member Cristhian gave me the phone call and he said that she has some baseline dementia but normally functions within a very limited sphere of walking outside going to the store and coming back.  Using an Citizen of the [place]  the patient was speaking nonsense words and not really answering.  When we try to get her on the CAT scan table though she kept saying "I want to go home."  Cristhian was able to calm her down to have her lay down for the CT for the stroke code.  Patient is a diabetic on metformin.  Her glucose was okay in trauma bay.  She is also hypertensive.  She is on no anticoagulation.  CT does not look like an acute bleed mass shift.  She has a white count of 19,000.  Presumably she has infection and we are currently in search of infection.  Patient awake and alert.  Has a chronic left sided eye droop.  Is able to stand and bear her weight.  Abdomen soft.    I performed a history and physical exam of the patient and discussed their management with the resident and /or advanced care provider. I reviewed the resident and /or ACP's note and agree with the documented findings and plan of care. My medical decison making and observations are found above.

## 2023-01-28 NOTE — CONSULT NOTE ADULT - CONSULT REQUESTED BY NAME
Ashley calling from [facility] reporting patient is getting prepped for surgery. Caller is requesting lab results from [facility]. Reviewed Epic 5/22/19 [facility] results of 3.4. Connected Ashley to Health Information to request fax.    Caller verbalized understanding. Denies further questions.    Gaby Harvey, RN  [facility]        
ED

## 2023-01-28 NOTE — ED ADULT NURSE REASSESSMENT NOTE - NS ED NURSE REASSESS COMMENT FT1
Report received from [facility]O Major. Patient calm, a&ox2, respirations even and unlabored. vitals stable. Patient denies any complaints, requesting to be discharged home, awaiting results and dispo. Olga RN with patient at CT.

## 2023-01-28 NOTE — CONSULT NOTE ADULT - SUBJECTIVE AND OBJECTIVE BOX
Neurology - Consult Note    -  Spectra: [phone] ([facility]), [phone] ([facility])  -    HPI: Patient JF RAMIREZ is a 72y (1950) woman with a PMHx significant for HTN, HLD, DM2 presenting as a code stroke LKW 1/28 9:30 AM reported by nephew for AMS. Patient was found in the evening by EMS wandering the streets, confused, and reportedly having speech difficulty. Of note, patient speaks [place], so unclear if there was a  present at the time EMS found patient. Nephew at bedside who does not live with patient says patient lives with aunt and patient has no hx of stroke and has not had a prior episode of confusion before. On neurological exam, patient has no focal deficits. Using [place]  [id number], patient was able to repeat phrases and per , patient's speech was almost fluent. 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 Patient takes a baby asa.     NIHSS:3  preMRS:2    Not a tenecteplase candidate given outside time window.       Review of Systems:   Does not answer iso mental status.     Allergies:      PMHx/PSHx/Family Hx: As above, otherwise see below   History of meningitis    Mild mental retardation    Cholelithiasis    Other diseases of tongue    Strabismus    Obesity        Social Hx:  No current use of tobacco, alcohol, or illicit drugs    Medications:  MEDICATIONS  (STANDING):  haloperidol    Injectable 2.5 milliGRAM(s) IntraMuscular Once  LORazepam   Injectable 2 milliGRAM(s) IV Push once    MEDICATIONS  (PRN):      Vitals:  T(C): 32.2 (01-28-23 @ 19:57), Max: 32.2 (01-28-23 @ 19:57)  HR: 102 (01-28-23 @ 19:57) (102 - 102)  BP: 104/56 (01-28-23 @ 19:57) (104/56 - 104/56)  RR: 18 (01-28-23 @ 19:57) (18 - 18)  SpO2: 100% (01-28-23 @ 19:57) (100% - 100%)    Physical Examination:   General - NAD  Eyes - Fundoscopy not performed due to safety precautions in the setting of the COVID-19 pandemic    Neurologic Exam:  Mental status - Awake, Alert, Oriented to person, place, not time. Speech is nearly fluent, repetition intact but does not follow further commands when asking to name objects. Follows simple commands. Attention/concentration, recent and remote memory (including registration and recall), and fund of knowledge not intact.    Cranial nerves - PERRLA, left eye exotropia, VFF, EOMI, face sensation (V1-V3) intact b/l, facial strength intact without asymmetry b/l, hearing intact b/l, palate with symmetric elevation, trapezius 5/5 strength b/l, tongue midline on protrusion with full lateral movement    Motor - Normal bulk and tone throughout. No pronator drift.  Strength testing            Deltoid      Biceps      Triceps     Wrist Extension    Wrist Flexion     Interossei         R            5                 5               5                     5                              5                        5                 5  L             5                 5               5                     5                              5                        5                 5              Hip Flexion    Hip Extension    Knee Flexion    Knee Extension    Dorsiflexion    Plantar Flexion  R              5                           5                       5                           5                            5                          5  L              5                           5                        5                           5                            5                          5    Sensation - Light touch/temperature intact throughout      Coordination - appropriate for level of strength     Gait and station - Wide based gait     Labs:          CAPILLARY BLOOD GLUCOSE  134 (28 Jan 2023 20:18)      POCT Blood Glucose.: 134 mg/dL (28 Jan 2023 19:56)          CSF:                  Radiology:     Neurology - Consult Note    -  Spectra: [phone] ([facility]), [phone] ([facility])  -    HPI: Patient JF RAMIREZ is a 72y (1950) woman with a PMHx significant for HTN, HLD, DM2 presenting as a code stroke LKW 1/28 9:30 AM reported by nephew for AMS. Patient was found in the evening by EMS wandering the streets, confused, and reportedly having speech difficulty. Of note, patient speaks [place]ese. EMS used a  at the scene and patient was reportedly confused and speech was not obviously clear. Nephew at bedside who does not live with patient says patient lives with aunt and patient has no hx of stroke and has not had a prior episode of confusion before. On neurological exam, patient has no focal deficits. Using [place]ese  201240, patient was able to repeat phrases and per , patient's speech was almost fluent. 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 Patient takes a baby asa.     Labs s/f for leukocytosis of 19 with left shift, lactate 3.4. Temperature 90F.     NIHSS:3  preMRS:2    Not a tenecteplase candidate given outside time window.   Not a thrombectomy candidate given no LVO.       Review of Systems:   Limited iso mental status.     Allergies:      PMHx/PSHx/Family Hx: As above, otherwise see below   History of meningitis    Mild mental retardation    Cholelithiasis    Other diseases of tongue    Strabismus    Obesity        Social Hx:  No current use of tobacco, alcohol, or illicit drugs    Medications:  MEDICATIONS  (STANDING):  haloperidol    Injectable 2.5 milliGRAM(s) IntraMuscular Once  LORazepam   Injectable 2 milliGRAM(s) IV Push once    MEDICATIONS  (PRN):      Vitals:  T(C): 32.2 (01-28-23 @ 19:57), Max: 32.2 (01-28-23 @ 19:57)  HR: 102 (01-28-23 @ 19:57) (102 - 102)  BP: 104/56 (01-28-23 @ 19:57) (104/56 - 104/56)  RR: 18 (01-28-23 @ 19:57) (18 - 18)  SpO2: 100% (01-28-23 @ 19:57) (100% - 100%)    Physical Examination:   General - NAD  Eyes - Fundoscopy not performed due to safety precautions in the setting of the COVID-19 pandemic    Neurologic Exam:  Mental status - Awake, Alert, Oriented to person, place (knows she is in the hospital, thinks she is in [facility]), not time (thinks it is July). Speech is nearly fluent, repetition intact but does not follow further commands when asking to name objects. Follows simple commands. Attention/concentration, recent and remote memory (including registration and recall), and fund of knowledge not intact.    Cranial nerves - PERRLA, left eye exotropia, VFF, EOMI, face sensation (V1-V3) intact b/l, facial strength intact without asymmetry b/l, hearing intact b/l, palate with symmetric elevation, trapezius 5/5 strength b/l, tongue midline on protrusion with full lateral movement    Motor - Normal bulk and tone throughout. No pronator drift.  Strength testing            Deltoid      Biceps      Triceps     Wrist Extension    Wrist Flexion     Interossei         R            5                 5               5                     5                              5                        5                 5  L             5                 5               5                     5                              5                        5                 5              Hip Flexion    Hip Extension    Knee Flexion    Knee Extension    Dorsiflexion    Plantar Flexion  R              5                           5                       5                           5                            5                          5  L              5                           5                        5                           5                            5                          5    Sensation - Light touch/temperature intact throughout      Coordination - appropriate for level of strength     Gait and station - Wide based gait     Labs:          CAPILLARY BLOOD GLUCOSE  134 (28 Jan 2023 20:18)      POCT Blood Glucose.: 134 mg/dL (28 Jan 2023 19:56)          CSF:                  Radiology:  < from: CT Brain Stroke Protocol (01.28.23 @ 20:36) >  No acute intracranial hemorrhage, vasogenic edema, hydrocephalus or   extra-axial collection. Unremarkable noncontrast head CT    Findings were discussed by Dr. Aguirre with Dr. Vidal on 1/28/2023   8:34 PM with read back confirmation.    < end of copied text >

## 2023-01-29 DIAGNOSIS — R65.10 SYSTEMIC INFLAMMATORY RESPONSE SYNDROME (SIRS) OF NON-INFECTIOUS ORIGIN WITHOUT ACUTE ORGAN DYSFUNCTION: ICD-10-CM

## 2023-01-29 DIAGNOSIS — R41.82 ALTERED MENTAL STATUS, UNSPECIFIED: ICD-10-CM

## 2023-01-29 DIAGNOSIS — H50.9 UNSPECIFIED STRABISMUS: ICD-10-CM

## 2023-01-29 DIAGNOSIS — G93.40 ENCEPHALOPATHY, UNSPECIFIED: ICD-10-CM

## 2023-01-29 DIAGNOSIS — Z29.9 ENCOUNTER FOR PROPHYLACTIC MEASURES, UNSPECIFIED: ICD-10-CM

## 2023-01-29 DIAGNOSIS — A41.9 SEPSIS, UNSPECIFIED ORGANISM: ICD-10-CM

## 2023-01-29 DIAGNOSIS — F79 UNSPECIFIED INTELLECTUAL DISABILITIES: ICD-10-CM

## 2023-01-29 DIAGNOSIS — E11.9 TYPE 2 DIABETES MELLITUS WITHOUT COMPLICATIONS: ICD-10-CM

## 2023-01-29 DIAGNOSIS — I10 ESSENTIAL (PRIMARY) HYPERTENSION: ICD-10-CM

## 2023-01-29 DIAGNOSIS — D72.829 ELEVATED WHITE BLOOD CELL COUNT, UNSPECIFIED: ICD-10-CM

## 2023-01-29 LAB
ALBUMIN SERPL ELPH-MCNC: 3.4 G/DL — SIGNIFICANT CHANGE UP (ref 3.3–5)
ALP SERPL-CCNC: 66 U/L — SIGNIFICANT CHANGE UP (ref 40–120)
ALT FLD-CCNC: 11 U/L — SIGNIFICANT CHANGE UP (ref 4–33)
ANION GAP SERPL CALC-SCNC: 12 MMOL/L — SIGNIFICANT CHANGE UP (ref 7–14)
APPEARANCE UR: CLEAR — SIGNIFICANT CHANGE UP
AST SERPL-CCNC: 17 U/L — SIGNIFICANT CHANGE UP (ref 4–32)
B PERT DNA SPEC QL NAA+PROBE: SIGNIFICANT CHANGE UP
B PERT+PARAPERT DNA PNL SPEC NAA+PROBE: SIGNIFICANT CHANGE UP
BACTERIA # UR AUTO: NEGATIVE — SIGNIFICANT CHANGE UP
BASOPHILS # BLD AUTO: 0.02 K/UL — SIGNIFICANT CHANGE UP (ref 0–0.2)
BASOPHILS NFR BLD AUTO: 0.2 % — SIGNIFICANT CHANGE UP (ref 0–2)
BILIRUB SERPL-MCNC: 0.5 MG/DL — SIGNIFICANT CHANGE UP (ref 0.2–1.2)
BILIRUB UR-MCNC: NEGATIVE — SIGNIFICANT CHANGE UP
BORDETELLA PARAPERTUSSIS (RAPRVP): SIGNIFICANT CHANGE UP
BUN SERPL-MCNC: 33 MG/DL — HIGH (ref 7–23)
C PNEUM DNA SPEC QL NAA+PROBE: SIGNIFICANT CHANGE UP
CALCIUM SERPL-MCNC: 8.4 MG/DL — SIGNIFICANT CHANGE UP (ref 8.4–10.5)
CHLORIDE SERPL-SCNC: 106 MMOL/L — SIGNIFICANT CHANGE UP (ref 98–107)
CK SERPL-CCNC: 153 U/L — SIGNIFICANT CHANGE UP (ref 25–170)
CK SERPL-CCNC: 262 U/L — HIGH (ref 25–170)
CO2 SERPL-SCNC: 18 MMOL/L — LOW (ref 22–31)
COLOR SPEC: YELLOW — SIGNIFICANT CHANGE UP
CREAT SERPL-MCNC: 0.96 MG/DL — SIGNIFICANT CHANGE UP (ref 0.5–1.3)
CRP SERPL-MCNC: 10.2 MG/L — HIGH
CRP SERPL-MCNC: 20 MG/L — HIGH
DIFF PNL FLD: NEGATIVE — SIGNIFICANT CHANGE UP
EGFR: 62 ML/MIN/1.73M2 — SIGNIFICANT CHANGE UP
EOSINOPHIL # BLD AUTO: 0 K/UL — SIGNIFICANT CHANGE UP (ref 0–0.5)
EOSINOPHIL NFR BLD AUTO: 0 % — SIGNIFICANT CHANGE UP (ref 0–6)
EPI CELLS # UR: 2 /HPF — SIGNIFICANT CHANGE UP (ref 0–5)
ERYTHROCYTE [SEDIMENTATION RATE] IN BLOOD: 12 MM/HR — SIGNIFICANT CHANGE UP (ref 4–25)
ETHANOL SERPL-MCNC: <10 MG/DL — SIGNIFICANT CHANGE UP
FLUAV SUBTYP SPEC NAA+PROBE: SIGNIFICANT CHANGE UP
FLUBV RNA SPEC QL NAA+PROBE: SIGNIFICANT CHANGE UP
FOLATE SERPL-MCNC: 19.1 NG/ML — HIGH (ref 3.1–17.5)
GLUCOSE BLDC GLUCOMTR-MCNC: 101 MG/DL — HIGH (ref 70–99)
GLUCOSE BLDC GLUCOMTR-MCNC: 106 MG/DL — HIGH (ref 70–99)
GLUCOSE BLDC GLUCOMTR-MCNC: 83 MG/DL — SIGNIFICANT CHANGE UP (ref 70–99)
GLUCOSE BLDC GLUCOMTR-MCNC: 91 MG/DL — SIGNIFICANT CHANGE UP (ref 70–99)
GLUCOSE BLDC GLUCOMTR-MCNC: 98 MG/DL — SIGNIFICANT CHANGE UP (ref 70–99)
GLUCOSE SERPL-MCNC: 160 MG/DL — HIGH (ref 70–99)
GLUCOSE UR QL: NEGATIVE — SIGNIFICANT CHANGE UP
HADV DNA SPEC QL NAA+PROBE: SIGNIFICANT CHANGE UP
HCOV 229E RNA SPEC QL NAA+PROBE: SIGNIFICANT CHANGE UP
HCOV HKU1 RNA SPEC QL NAA+PROBE: SIGNIFICANT CHANGE UP
HCOV NL63 RNA SPEC QL NAA+PROBE: SIGNIFICANT CHANGE UP
HCOV OC43 RNA SPEC QL NAA+PROBE: SIGNIFICANT CHANGE UP
HCT VFR BLD CALC: 36.8 % — SIGNIFICANT CHANGE UP (ref 34.5–45)
HGB BLD-MCNC: 12 G/DL — SIGNIFICANT CHANGE UP (ref 11.5–15.5)
HIV 1+2 AB+HIV1 P24 AG SERPL QL IA: SIGNIFICANT CHANGE UP
HMPV RNA SPEC QL NAA+PROBE: SIGNIFICANT CHANGE UP
HPIV1 RNA SPEC QL NAA+PROBE: SIGNIFICANT CHANGE UP
HPIV2 RNA SPEC QL NAA+PROBE: SIGNIFICANT CHANGE UP
HPIV3 RNA SPEC QL NAA+PROBE: SIGNIFICANT CHANGE UP
HPIV4 RNA SPEC QL NAA+PROBE: SIGNIFICANT CHANGE UP
HYALINE CASTS # UR AUTO: 4 /LPF — SIGNIFICANT CHANGE UP (ref 0–7)
IANC: 9.54 K/UL — HIGH (ref 1.8–7.4)
IMM GRANULOCYTES NFR BLD AUTO: 0.4 % — SIGNIFICANT CHANGE UP (ref 0–0.9)
KETONES UR-MCNC: ABNORMAL
LACTATE SERPL-SCNC: 0.9 MMOL/L — SIGNIFICANT CHANGE UP (ref 0.5–2)
LEUKOCYTE ESTERASE UR-ACNC: ABNORMAL
LYMPHOCYTES # BLD AUTO: 1.55 K/UL — SIGNIFICANT CHANGE UP (ref 1–3.3)
LYMPHOCYTES # BLD AUTO: 12.8 % — LOW (ref 13–44)
M PNEUMO DNA SPEC QL NAA+PROBE: SIGNIFICANT CHANGE UP
MCHC RBC-ENTMCNC: 29.8 PG — SIGNIFICANT CHANGE UP (ref 27–34)
MCHC RBC-ENTMCNC: 32.6 GM/DL — SIGNIFICANT CHANGE UP (ref 32–36)
MCV RBC AUTO: 91.3 FL — SIGNIFICANT CHANGE UP (ref 80–100)
MONOCYTES # BLD AUTO: 0.98 K/UL — HIGH (ref 0–0.9)
MONOCYTES NFR BLD AUTO: 8.1 % — SIGNIFICANT CHANGE UP (ref 2–14)
NEUTROPHILS # BLD AUTO: 9.54 K/UL — HIGH (ref 1.8–7.4)
NEUTROPHILS NFR BLD AUTO: 78.5 % — HIGH (ref 43–77)
NITRITE UR-MCNC: NEGATIVE — SIGNIFICANT CHANGE UP
NRBC # BLD: 0 /100 WBCS — SIGNIFICANT CHANGE UP (ref 0–0)
NRBC # FLD: 0 K/UL — SIGNIFICANT CHANGE UP (ref 0–0)
PH UR: 6 — SIGNIFICANT CHANGE UP (ref 5–8)
PLATELET # BLD AUTO: 285 K/UL — SIGNIFICANT CHANGE UP (ref 150–400)
POTASSIUM SERPL-MCNC: 4.6 MMOL/L — SIGNIFICANT CHANGE UP (ref 3.5–5.3)
POTASSIUM SERPL-SCNC: 4.6 MMOL/L — SIGNIFICANT CHANGE UP (ref 3.5–5.3)
PROT SERPL-MCNC: 5.9 G/DL — LOW (ref 6–8.3)
PROT SERPL-MCNC: 6 G/DL — SIGNIFICANT CHANGE UP (ref 6–8.3)
PROT UR-MCNC: ABNORMAL
RAPID RVP RESULT: SIGNIFICANT CHANGE UP
RBC # BLD: 4.03 M/UL — SIGNIFICANT CHANGE UP (ref 3.8–5.2)
RBC # FLD: 13.3 % — SIGNIFICANT CHANGE UP (ref 10.3–14.5)
RBC CASTS # UR COMP ASSIST: 1 /HPF — SIGNIFICANT CHANGE UP (ref 0–4)
RSV RNA SPEC QL NAA+PROBE: SIGNIFICANT CHANGE UP
RV+EV RNA SPEC QL NAA+PROBE: SIGNIFICANT CHANGE UP
SARS-COV-2 RNA SPEC QL NAA+PROBE: SIGNIFICANT CHANGE UP
SODIUM SERPL-SCNC: 136 MMOL/L — SIGNIFICANT CHANGE UP (ref 135–145)
SP GR SPEC: >1.05 (ref 1.01–1.05)
TSH SERPL-MCNC: 0.84 UIU/ML — SIGNIFICANT CHANGE UP (ref 0.27–4.2)
UROBILINOGEN FLD QL: SIGNIFICANT CHANGE UP
VIT B12 SERPL-MCNC: 584 PG/ML — SIGNIFICANT CHANGE UP (ref 200–900)
WBC # BLD: 12.14 K/UL — HIGH (ref 3.8–10.5)
WBC # FLD AUTO: 12.14 K/UL — HIGH (ref 3.8–10.5)
WBC UR QL: 17 /HPF — HIGH (ref 0–5)

## 2023-01-29 PROCEDURE — 99223 1ST HOSP IP/OBS HIGH 75: CPT

## 2023-01-29 PROCEDURE — 12345: CPT | Mod: NC

## 2023-01-29 PROCEDURE — 84165 PROTEIN E-PHORESIS SERUM: CPT | Mod: 26

## 2023-01-29 RX ORDER — ASPIRIN/CALCIUM CARB/MAGNESIUM 324 MG
81 TABLET ORAL DAILY
Refills: 0 | Status: DISCONTINUED | OUTPATIENT
Start: 2023-01-29 | End: 2023-01-30

## 2023-01-29 RX ORDER — DEXTROSE 50 % IN WATER 50 %
25 SYRINGE (ML) INTRAVENOUS ONCE
Refills: 0 | Status: DISCONTINUED | OUTPATIENT
Start: 2023-01-29 | End: 2023-01-30

## 2023-01-29 RX ORDER — METFORMIN HYDROCHLORIDE 850 MG/1
1 TABLET ORAL
Qty: 0 | Refills: 0 | DISCHARGE

## 2023-01-29 RX ORDER — CEFTRIAXONE 500 MG/1
1000 INJECTION, POWDER, FOR SOLUTION INTRAMUSCULAR; INTRAVENOUS EVERY 24 HOURS
Refills: 0 | Status: DISCONTINUED | OUTPATIENT
Start: 2023-01-29 | End: 2023-01-30

## 2023-01-29 RX ORDER — ATORVASTATIN CALCIUM 80 MG/1
20 TABLET, FILM COATED ORAL AT BEDTIME
Refills: 0 | Status: DISCONTINUED | OUTPATIENT
Start: 2023-01-29 | End: 2023-01-30

## 2023-01-29 RX ORDER — SITAGLIPTIN 50 MG/1
1 TABLET, FILM COATED ORAL
Qty: 0 | Refills: 0 | DISCHARGE

## 2023-01-29 RX ORDER — INSULIN LISPRO 100/ML
VIAL (ML) SUBCUTANEOUS
Refills: 0 | Status: DISCONTINUED | OUTPATIENT
Start: 2023-01-29 | End: 2023-01-30

## 2023-01-29 RX ORDER — CEFTRIAXONE 500 MG/1
1000 INJECTION, POWDER, FOR SOLUTION INTRAMUSCULAR; INTRAVENOUS ONCE
Refills: 0 | Status: COMPLETED | OUTPATIENT
Start: 2023-01-29 | End: 2023-01-29

## 2023-01-29 RX ORDER — DEXTROSE 50 % IN WATER 50 %
15 SYRINGE (ML) INTRAVENOUS ONCE
Refills: 0 | Status: DISCONTINUED | OUTPATIENT
Start: 2023-01-29 | End: 2023-01-30

## 2023-01-29 RX ORDER — SODIUM CHLORIDE 9 MG/ML
1000 INJECTION, SOLUTION INTRAVENOUS
Refills: 0 | Status: DISCONTINUED | OUTPATIENT
Start: 2023-01-29 | End: 2023-01-30

## 2023-01-29 RX ORDER — CHOLECALCIFEROL (VITAMIN D3) 125 MCG
1 CAPSULE ORAL
Qty: 0 | Refills: 0 | DISCHARGE

## 2023-01-29 RX ORDER — ENOXAPARIN SODIUM 100 MG/ML
40 INJECTION SUBCUTANEOUS EVERY 24 HOURS
Refills: 0 | Status: DISCONTINUED | OUTPATIENT
Start: 2023-01-29 | End: 2023-01-30

## 2023-01-29 RX ORDER — DEXTROSE 50 % IN WATER 50 %
12.5 SYRINGE (ML) INTRAVENOUS ONCE
Refills: 0 | Status: DISCONTINUED | OUTPATIENT
Start: 2023-01-29 | End: 2023-01-30

## 2023-01-29 RX ORDER — INSULIN LISPRO 100/ML
VIAL (ML) SUBCUTANEOUS AT BEDTIME
Refills: 0 | Status: DISCONTINUED | OUTPATIENT
Start: 2023-01-29 | End: 2023-01-30

## 2023-01-29 RX ORDER — ROSUVASTATIN CALCIUM 5 MG/1
1 TABLET ORAL
Qty: 0 | Refills: 0 | DISCHARGE

## 2023-01-29 RX ORDER — ASPIRIN/CALCIUM CARB/MAGNESIUM 324 MG
0 TABLET ORAL
Qty: 0 | Refills: 0 | DISCHARGE

## 2023-01-29 RX ORDER — GLUCAGON INJECTION, SOLUTION 0.5 MG/.1ML
1 INJECTION, SOLUTION SUBCUTANEOUS ONCE
Refills: 0 | Status: DISCONTINUED | OUTPATIENT
Start: 2023-01-29 | End: 2023-01-30

## 2023-01-29 RX ADMIN — CEFTRIAXONE 100 MILLIGRAM(S): 500 INJECTION, POWDER, FOR SOLUTION INTRAMUSCULAR; INTRAVENOUS at 00:47

## 2023-01-29 RX ADMIN — ATORVASTATIN CALCIUM 20 MILLIGRAM(S): 80 TABLET, FILM COATED ORAL at 22:32

## 2023-01-29 RX ADMIN — Medication 81 MILLIGRAM(S): at 09:35

## 2023-01-29 RX ADMIN — ENOXAPARIN SODIUM 40 MILLIGRAM(S): 100 INJECTION SUBCUTANEOUS at 17:48

## 2023-01-29 NOTE — H&P ADULT - PROBLEM/PLAN-2
Elidel Pregnancy And Lactation Text: This medication is Pregnancy Category C. It is unknown if this medication is excreted in breast milk. Cyclophosphamide Counseling:  I discussed with the patient the risks of cyclophosphamide including but not limited to hair loss, hormonal abnormalities, decreased fertility, abdominal pain, diarrhea, nausea and vomiting, bone marrow suppression and infection. The patient understands that monitoring is required while taking this medication. Xeljanz Counseling: I discussed with the patient the risks of Xeljanz therapy including increased risk of infection, liver issues, headache, diarrhea, or cold symptoms. Live vaccines should be avoided. They were instructed to call if they have any problems. Detail Level: Detailed Odomzo Counseling- I discussed with the patient the risks of Odomzo including but not limited to nausea, vomiting, diarrhea, constipation, weight loss, changes in the sense of taste, decreased appetite, muscle spasms, and hair loss.  The patient verbalized understanding of the proper use and possible adverse effects of Odomzo.  All of the patient's questions and concerns were addressed. Methotrexate Pregnancy And Lactation Text: This medication is Pregnancy Category X and is known to cause fetal harm. This medication is excreted in breast milk. Terbinafine Counseling: Patient counseling regarding adverse effects of terbinafine including but not limited to headache, diarrhea, rash, upset stomach, liver function test abnormalities, itching, taste/smell disturbance, nausea, abdominal pain, and flatulence.  There is a rare possibility of liver failure that can occur when taking terbinafine.  The patient understands that a baseline LFT and kidney function test may be required. The patient verbalized understanding of the proper use and possible adverse effects of terbinafine.  All of the patient's questions and concerns were addressed. Doxycycline Counseling:  Patient counseled regarding possible photosensitivity and increased risk for sunburn.  Patient instructed to avoid sunlight, if possible.  When exposed to sunlight, patients should wear protective clothing, sunglasses, and sunscreen.  The patient was instructed to call the office immediately if the following severe adverse effects occur:  hearing changes, easy bruising/bleeding, severe headache, or vision changes.  The patient verbalized understanding of the proper use and possible adverse effects of doxycycline.  All of the patient's questions and concerns were addressed. Cellcept Pregnancy And Lactation Text: This medication is Pregnancy Category D and isn't considered safe during pregnancy. It is unknown if this medication is excreted in breast milk. Ilumya Counseling: I discussed with the patient the risks of tildrakizumab including but not limited to immunosuppression, malignancy, posterior leukoencephalopathy syndrome, and serious infections.  The patient understands that monitoring is required including a PPD at baseline and must alert us or the primary physician if symptoms of infection or other concerning signs are noted. Eucrisa Counseling: Patient may experience a mild burning sensation during topical application. Eucrisa is not approved in children less than 2 years of age. Dupixent Pregnancy And Lactation Text: This medication likely crosses the placenta but the risk for the fetus is uncertain. This medication is excreted in breast milk. Dapsone Pregnancy And Lactation Text: This medication is Pregnancy Category C and is not considered safe during pregnancy or breast feeding. Azithromycin Counseling:  I discussed with the patient the risks of azithromycin including but not limited to GI upset, allergic reaction, drug rash, diarrhea, and yeast infections. Imiquimod Counseling:  I discussed with the patient the risks of imiquimod including but not limited to erythema, scaling, itching, weeping, crusting, and pain.  Patient understands that the inflammatory response to imiquimod is variable from person to person and was educated regarded proper titration schedule.  If flu-like symptoms develop, patient knows to discontinue the medication and contact us. DISPLAY PLAN FREE TEXT Erivedge Counseling- I discussed with the patient the risks of Erivedge including but not limited to nausea, vomiting, diarrhea, constipation, weight loss, changes in the sense of taste, decreased appetite, muscle spasms, and hair loss.  The patient verbalized understanding of the proper use and possible adverse effects of Erivedge.  All of the patient's questions and concerns were addressed. Oxybutynin Counseling:  I discussed with the patient the risks of oxybutynin including but not limited to skin rash, drowsiness, dry mouth, difficulty urinating, and blurred vision. Oxybutynin Pregnancy And Lactation Text: This medication is Pregnancy Category B and is considered safe during pregnancy. It is unknown if it is excreted in breast milk. Topical Retinoid counseling:  Patient advised to apply a pea-sized amount only at bedtime and wait 30 minutes after washing their face before applying.  If too drying, patient may add a non-comedogenic moisturizer. The patient verbalized understanding of the proper use and possible adverse effects of retinoids.  All of the patient's questions and concerns were addressed. Benzoyl Peroxide Pregnancy And Lactation Text: This medication is Pregnancy Category C. It is unknown if benzoyl peroxide is excreted in breast milk. Tetracycline Counseling: Patient counseled regarding possible photosensitivity and increased risk for sunburn.  Patient instructed to avoid sunlight, if possible.  When exposed to sunlight, patients should wear protective clothing, sunglasses, and sunscreen.  The patient was instructed to call the office immediately if the following severe adverse effects occur:  hearing changes, easy bruising/bleeding, severe headache, or vision changes.  The patient verbalized understanding of the proper use and possible adverse effects of tetracycline.  All of the patient's questions and concerns were addressed. Patient understands to avoid pregnancy while on therapy due to potential birth defects. Topical Clindamycin Counseling: Patient counseled that this medication may cause skin irritation or allergic reactions.  In the event of skin irritation, the patient was advised to reduce the amount of the drug applied or use it less frequently.   The patient verbalized understanding of the proper use and possible adverse effects of clindamycin.  All of the patient's questions and concerns were addressed. Griseofulvin Pregnancy And Lactation Text: This medication is Pregnancy Category X and is known to cause serious birth defects. It is unknown if this medication is excreted in breast milk but breast feeding should be avoided. Siliq Counseling:  I discussed with the patient the risks of Siliq including but not limited to new or worsening depression, suicidal thoughts and behavior, immunosuppression, malignancy, posterior leukoencephalopathy syndrome, and serious infections.  The patient understands that monitoring is required including a PPD at baseline and must alert us or the primary physician if symptoms of infection or other concerning signs are noted. There is also a special program designed to monitor depression which is required with Siliq. Erythromycin Pregnancy And Lactation Text: This medication is Pregnancy Category B and is considered safe during pregnancy. It is also excreted in breast milk. High Dose Vitamin A Pregnancy And Lactation Text: High dose vitamin A therapy is contraindicated during pregnancy and breast feeding. Thalidomide Pregnancy And Lactation Text: This medication is Pregnancy Category X and is absolutely contraindicated during pregnancy. It is unknown if it is excreted in breast milk. Tremfya Counseling: I discussed with the patient the risks of guselkumab including but not limited to immunosuppression, serious infections, and drug reactions.  The patient understands that monitoring is required including a PPD at baseline and must alert us or the primary physician if symptoms of infection or other concerning signs are noted. Cephalexin Pregnancy And Lactation Text: This medication is Pregnancy Category B and considered safe during pregnancy.  It is also excreted in breast milk but can be used safely for shorter doses. Clofazimine Counseling:  I discussed with the patient the risks of clofazimine including but not limited to skin and eye pigmentation, liver damage, nausea/vomiting, gastrointestinal bleeding and allergy. Protopic Pregnancy And Lactation Text: This medication is Pregnancy Category C. It is unknown if this medication is excreted in breast milk when applied topically. Birth Control Pills Counseling: Birth Control Pill Counseling: I discussed with the patient the potential side effects of OCPs including but not limited to increased risk of stroke, heart attack, thrombophlebitis, deep venous thrombosis, hepatic adenomas, breast changes, GI upset, headaches, and depression.  The patient verbalized understanding of the proper use and possible adverse effects of OCPs. All of the patient's questions and concerns were addressed. 5-Fu Counseling: 5-Fluorouracil Counseling:  I discussed with the patient the risks of 5-fluorouracil including but not limited to erythema, scaling, itching, weeping, crusting, and pain. Minoxidil Pregnancy And Lactation Text: This medication has not been assigned a Pregnancy Risk Category but animal studies failed to show danger with the topical medication. It is unknown if the medication is excreted in breast milk. Itraconazole Counseling:  I discussed with the patient the risks of itraconazole including but not limited to liver damage, nausea/vomiting, neuropathy, and severe allergy.  The patient understands that this medication is best absorbed when taken with acidic beverages such as non-diet cola or ginger ale.  The patient understands that monitoring is required including baseline LFTs and repeat LFTs at intervals.  The patient understands that they are to contact us or the primary physician if concerning signs are noted. Sarecycline Counseling: Patient advised regarding possible photosensitivity and discoloration of the teeth, skin, lips, tongue and gums.  Patient instructed to avoid sunlight, if possible.  When exposed to sunlight, patients should wear protective clothing, sunglasses, and sunscreen.  The patient was instructed to call the office immediately if the following severe adverse effects occur:  hearing changes, easy bruising/bleeding, severe headache, or vision changes.  The patient verbalized understanding of the proper use and possible adverse effects of sarecycline.  All of the patient's questions and concerns were addressed. Elidel Counseling: Patient may experience a mild burning sensation during topical application. Elidel is not approved in children less than 2 years of age. There have been case reports of hematologic and skin malignancies in patients using topical calcineurin inhibitors although causality is questionable. Hydroquinone Counseling:  Patient advised that medication may result in skin irritation, lightening (hypopigmentation), dryness, and burning.  In the event of skin irritation, the patient was advised to reduce the amount of the drug applied or use it less frequently.  Rarely, spots that are treated with hydroquinone can become darker (pseudoochronosis).  Should this occur, patient instructed to stop medication and call the office. The patient verbalized understanding of the proper use and possible adverse effects of hydroquinone.  All of the patient's questions and concerns were addressed. Topical Sulfur Applications Counseling: Topical Sulfur Counseling: Patient counseled that this medication may cause skin irritation or allergic reactions.  In the event of skin irritation, the patient was advised to reduce the amount of the drug applied or use it less frequently.   The patient verbalized understanding of the proper use and possible adverse effects of topical sulfur application.  All of the patient's questions and concerns were addressed. Enbrel Pregnancy And Lactation Text: This medication is Pregnancy Category B and is considered safe during pregnancy. It is unknown if this medication is excreted in breast milk. Xolair Pregnancy And Lactation Text: This medication is Pregnancy Category B and is considered safe during pregnancy. This medication is excreted in breast milk. Spironolactone Pregnancy And Lactation Text: This medication can cause feminization of the male fetus and should be avoided during pregnancy. The active metabolite is also found in breast milk. Glycopyrrolate Counseling:  I discussed with the patient the risks of glycopyrrolate including but not limited to skin rash, drowsiness, dry mouth, difficulty urinating, and blurred vision. Cimzia Counseling:  I discussed with the patient the risks of Cimzia including but not limited to immunosuppression, allergic reactions and infections.  The patient understands that monitoring is required including a PPD at baseline and must alert us or the primary physician if symptoms of infection or other concerning signs are noted. Metronidazole Pregnancy And Lactation Text: This medication is Pregnancy Category B and considered safe during pregnancy.  It is also excreted in breast milk. Use Enhanced Medication Counseling?: No Protopic Counseling: Patient may experience a mild burning sensation during topical application. Protopic is not approved in children less than 2 years of age. There have been case reports of hematologic and skin malignancies in patients using topical calcineurin inhibitors although causality is questionable. Solaraze Counseling:  I discussed with the patient the risks of Solaraze including but not limited to erythema, scaling, itching, weeping, crusting, and pain. Carac Counseling:  I discussed with the patient the risks of Carac including but not limited to erythema, scaling, itching, weeping, crusting, and pain. Isotretinoin Pregnancy And Lactation Text: This medication is Pregnancy Category X and is considered extremely dangerous during pregnancy. It is unknown if it is excreted in breast milk. Simponi Counseling:  I discussed with the patient the risks of golimumab including but not limited to myelosuppression, immunosuppression, autoimmune hepatitis, demyelinating diseases, lymphoma, and serious infections.  The patient understands that monitoring is required including a PPD at baseline and must alert us or the primary physician if symptoms of infection or other concerning signs are noted. Dupixent Counseling: I discussed with the patient the risks of dupilumab including but not limited to eye infection and irritation, cold sores, injection site reactions, worsening of asthma, allergic reactions and increased risk of parasitic infection.  Live vaccines should be avoided while taking dupilumab. Dupilumab will also interact with certain medications such as warfarin and cyclosporine. The patient understands that monitoring is required and they must alert us or the primary physician if symptoms of infection or other concerning signs are noted. Simponi Pregnancy And Lactation Text: The risk during pregnancy and breastfeeding is uncertain with this medication. Acitretin Pregnancy And Lactation Text: This medication is Pregnancy Category X and should not be given to women who are pregnant or may become pregnant in the future. This medication is excreted in breast milk. Albendazole Pregnancy And Lactation Text: This medication is Pregnancy Category C and it isn't known if it is safe during pregnancy. It is also excreted in breast milk. Benzoyl Peroxide Counseling: Patient counseled that medicine may cause skin irritation and bleach clothing.  In the event of skin irritation, the patient was advised to reduce the amount of the drug applied or use it less frequently.   The patient verbalized understanding of the proper use and possible adverse effects of benzoyl peroxide.  All of the patient's questions and concerns were addressed. Prednisone Pregnancy And Lactation Text: This medication is Pregnancy Category C and it isn't know if it is safe during pregnancy. This medication is excreted in breast milk. Arava Counseling:  Patient counseled regarding adverse effects of Arava including but not limited to nausea, vomiting, abnormalities in liver function tests. Patients may develop mouth sores, rash, diarrhea, and abnormalities in blood counts. The patient understands that monitoring is required including LFTs and blood counts.  There is a rare possibility of scarring of the liver and lung problems that can occur when taking methotrexate. Persistent nausea, loss of appetite, pale stools, dark urine, cough, and shortness of breath should be reported immediately. Patient advised to discontinue Arava treatment and consult with a physician prior to attempting conception. The patient will have to undergo a treatment to eliminate Arava from the body prior to conception. Valtrex Counseling: I discussed with the patient the risks of valacyclovir including but not limited to kidney damage, nausea, vomiting and severe allergy.  The patient understands that if the infection seems to be worsening or is not improving, they are to call. Azathioprine Counseling:  I discussed with the patient the risks of azathioprine including but not limited to myelosuppression, immunosuppression, hepatotoxicity, lymphoma, and infections.  The patient understands that monitoring is required including baseline LFTs, Creatinine, possible TPMP genotyping and weekly CBCs for the first month and then every 2 weeks thereafter.  The patient verbalized understanding of the proper use and possible adverse effects of azathioprine.  All of the patient's questions and concerns were addressed. Doxycycline Pregnancy And Lactation Text: This medication is Pregnancy Category D and not consider safe during pregnancy. It is also excreted in breast milk but is considered safe for shorter treatment courses. Drysol Counseling:  I discussed with the patient the risks of drysol/aluminum chloride including but not limited to skin rash, itching, irritation, burning. Cyclosporine Counseling:  I discussed with the patient the risks of cyclosporine including but not limited to hypertension, gingival hyperplasia,myelosuppression, immunosuppression, liver damage, kidney damage, neurotoxicity, lymphoma, and serious infections. The patient understands that monitoring is required including baseline blood pressure, CBC, CMP, lipid panel and uric acid, and then 1-2 times monthly CMP and blood pressure. Rituxan Pregnancy And Lactation Text: This medication is Pregnancy Category C and it isn't know if it is safe during pregnancy. It is unknown if this medication is excreted in breast milk but similar antibodies are known to be excreted. Hydroxychloroquine Counseling:  I discussed with the patient that a baseline ophthalmologic exam is needed at the start of therapy and every year thereafter while on therapy. A CBC may also be warranted for monitoring.  The side effects of this medication were discussed with the patient, including but not limited to agranulocytosis, aplastic anemia, seizures, rashes, retinopathy, and liver toxicity. Patient instructed to call the office should any adverse effect occur.  The patient verbalized understanding of the proper use and possible adverse effects of Plaquenil.  All the patient's questions and concerns were addressed. Griseofulvin Counseling:  I discussed with the patient the risks of griseofulvin including but not limited to photosensitivity, cytopenia, liver damage, nausea/vomiting and severe allergy.  The patient understands that this medication is best absorbed when taken with a fatty meal (e.g., ice cream or french fries). SSKI Counseling:  I discussed with the patient the risks of SSKI including but not limited to thyroid abnormalities, metallic taste, GI upset, fever, headache, acne, arthralgias, paraesthesias, lymphadenopathy, easy bleeding, arrhythmias, and allergic reaction. Minocycline Pregnancy And Lactation Text: This medication is Pregnancy Category D and not consider safe during pregnancy. It is also excreted in breast milk. Hydroxyzine Counseling: Patient advised that the medication is sedating and not to drive a car after taking this medication.  Patient informed of potential adverse effects including but not limited to dry mouth, urinary retention, and blurry vision.  The patient verbalized understanding of the proper use and possible adverse effects of hydroxyzine.  All of the patient's questions and concerns were addressed. Bexarotene Pregnancy And Lactation Text: This medication is Pregnancy Category X and should not be given to women who are pregnant or may become pregnant. This medication should not be used if you are breast feeding. Cimzia Pregnancy And Lactation Text: This medication crosses the placenta but can be considered safe in certain situations. Cimzia may be excreted in breast milk. Rifampin Pregnancy And Lactation Text: This medication is Pregnancy Category C and it isn't know if it is safe during pregnancy. It is also excreted in breast milk and should not be used if you are breast feeding. Terbinafine Pregnancy And Lactation Text: This medication is Pregnancy Category B and is considered safe during pregnancy. It is also excreted in breast milk and breast feeding isn't recommended. Doxepin Counseling:  Patient advised that the medication is sedating and not to drive a car after taking this medication. Patient informed of potential adverse effects including but not limited to dry mouth, urinary retention, and blurry vision.  The patient verbalized understanding of the proper use and possible adverse effects of doxepin.  All of the patient's questions and concerns were addressed. Isotretinoin Counseling: Patient should get monthly blood tests, not donate blood, not drive at night if vision affected, not share medication, and not undergo elective surgery for 6 months after tx completed. Side effects reviewed, pt to contact office should one occur. Albendazole Counseling:  I discussed with the patient the risks of albendazole including but not limited to cytopenia, kidney damage, nausea/vomiting and severe allergy.  The patient understands that this medication is being used in an off-label manner. Minocycline Counseling: Patient advised regarding possible photosensitivity and discoloration of the teeth, skin, lips, tongue and gums.  Patient instructed to avoid sunlight, if possible.  When exposed to sunlight, patients should wear protective clothing, sunglasses, and sunscreen.  The patient was instructed to call the office immediately if the following severe adverse effects occur:  hearing changes, easy bruising/bleeding, severe headache, or vision changes.  The patient verbalized understanding of the proper use and possible adverse effects of minocycline.  All of the patient's questions and concerns were addressed. Enbrel Counseling:  I discussed with the patient the risks of etanercept including but not limited to myelosuppression, immunosuppression, autoimmune hepatitis, demyelinating diseases, lymphoma, and infections.  The patient understands that monitoring is required including a PPD at baseline and must alert us or the primary physician if symptoms of infection or other concerning signs are noted. Hydroxyzine Pregnancy And Lactation Text: This medication is not safe during pregnancy and should not be taken. It is also excreted in breast milk and breast feeding isn't recommended. Glycopyrrolate Pregnancy And Lactation Text: This medication is Pregnancy Category B and is considered safe during pregnancy. It is unknown if it is excreted breast milk. Fluconazole Pregnancy And Lactation Text: This medication is Pregnancy Category C and it isn't know if it is safe during pregnancy. It is also excreted in breast milk. Thalidomide Counseling: I discussed with the patient the risks of thalidomide including but not limited to birth defects, anxiety, weakness, chest pain, dizziness, cough and severe allergy. Azithromycin Pregnancy And Lactation Text: This medication is considered safe during pregnancy and is also secreted in breast milk. Bactrim Pregnancy And Lactation Text: This medication is Pregnancy Category D and is known to cause fetal risk.  It is also excreted in breast milk. 5-Fu Pregnancy And Lactation Text: This medication is Pregnancy Category X and contraindicated in pregnancy and in women who may become pregnant. It is unknown if this medication is excreted in breast milk. Fluconazole Counseling:  Patient counseled regarding adverse effects of fluconazole including but not limited to headache, diarrhea, nausea, upset stomach, liver function test abnormalities, taste disturbance, and stomach pain.  There is a rare possibility of liver failure that can occur when taking fluconazole.  The patient understands that monitoring of LFTs and kidney function test may be required, especially at baseline. The patient verbalized understanding of the proper use and possible adverse effects of fluconazole.  All of the patient's questions and concerns were addressed. Cimetidine Counseling:  I discussed with the patient the risks of Cimetidine including but not limited to gynecomastia, headache, diarrhea, nausea, drowsiness, arrhythmias, pancreatitis, skin rashes, psychosis, bone marrow suppression and kidney toxicity. Ivermectin Counseling:  Patient instructed to take medication on an empty stomach with a full glass of water.  Patient informed of potential adverse effects including but not limited to nausea, diarrhea, dizziness, itching, and swelling of the extremities or lymph nodes.  The patient verbalized understanding of the proper use and possible adverse effects of ivermectin.  All of the patient's questions and concerns were addressed. Bactrim Counseling:  I discussed with the patient the risks of sulfa antibiotics including but not limited to GI upset, allergic reaction, drug rash, diarrhea, dizziness, photosensitivity, and yeast infections.  Rarely, more serious reactions can occur including but not limited to aplastic anemia, agranulocytosis, methemoglobinemia, blood dyscrasias, liver or kidney failure, lung infiltrates or desquamative/blistering drug rashes. Rifampin Counseling: I discussed with the patient the risks of rifampin including but not limited to liver damage, kidney damage, red-orange body fluids, nausea/vomiting and severe allergy. Ketoconazole Pregnancy And Lactation Text: This medication is Pregnancy Category C and it isn't know if it is safe during pregnancy. It is also excreted in breast milk and breast feeding isn't recommended. Taltz Counseling: I discussed with the patient the risks of ixekizumab including but not limited to immunosuppression, serious infections, worsening of inflammatory bowel disease and drug reactions.  The patient understands that monitoring is required including a PPD at baseline and must alert us or the primary physician if symptoms of infection or other concerning signs are noted. Minoxidil Counseling: Minoxidil is a topical medication which can increase blood flow where it is applied. It is uncertain how this medication increases hair growth. Side effects are uncommon and include stinging and allergic reactions. Colchicine Counseling:  Patient counseled regarding adverse effects including but not limited to stomach upset (nausea, vomiting, stomach pain, or diarrhea).  Patient instructed to limit alcohol consumption while taking this medication.  Colchicine may reduce blood counts especially with prolonged use.  The patient understands that monitoring of kidney function and blood counts may be required, especially at baseline. The patient verbalized understanding of the proper use and possible adverse effects of colchicine.  All of the patient's questions and concerns were addressed. Dapsone Counseling: I discussed with the patient the risks of dapsone including but not limited to hemolytic anemia, agranulocytosis, rashes, methemoglobinemia, kidney failure, peripheral neuropathy, headaches, GI upset, and liver toxicity.  Patients who start dapsone require monitoring including baseline LFTs and weekly CBCs for the first month, then every month thereafter.  The patient verbalized understanding of the proper use and possible adverse effects of dapsone.  All of the patient's questions and concerns were addressed. Prednisone Counseling:  I discussed with the patient the risks of prolonged use of prednisone including but not limited to weight gain, insomnia, osteoporosis, mood changes, diabetes, susceptibility to infection, glaucoma and high blood pressure.  In cases where prednisone use is prolonged, patients should be monitored with blood pressure checks, serum glucose levels and an eye exam.  Additionally, the patient may need to be placed on GI prophylaxis, PCP prophylaxis, and calcium and vitamin D supplementation and/or a bisphosphonate.  The patient verbalized understanding of the proper use and the possible adverse effects of prednisone.  All of the patient's questions and concerns were addressed. Methotrexate Counseling:  Patient counseled regarding adverse effects of methotrexate including but not limited to nausea, vomiting, abnormalities in liver function tests. Patients may develop mouth sores, rash, diarrhea, and abnormalities in blood counts. The patient understands that monitoring is required including LFT's and blood counts.  There is a rare possibility of scarring of the liver and lung problems that can occur when taking methotrexate. Persistent nausea, loss of appetite, pale stools, dark urine, cough, and shortness of breath should be reported immediately. Patient advised to discontinue methotrexate treatment at least three months before attempting to become pregnant.  I discussed the need for folate supplements while taking methotrexate.  These supplements can decrease side effects during methotrexate treatment. The patient verbalized understanding of the proper use and possible adverse effects of methotrexate.  All of the patient's questions and concerns were addressed. Drysol Pregnancy And Lactation Text: This medication is considered safe during pregnancy and breast feeding. Otezla Pregnancy And Lactation Text: This medication is Pregnancy Category C and it isn't known if it is safe during pregnancy. It is unknown if it is excreted in breast milk. Xelhongz Pregnancy And Lactation Text: This medication is Pregnancy Category D and is not considered safe during pregnancy.  The risk during breast feeding is also uncertain. Rituxan Counseling:  I discussed with the patient the risks of Rituxan infusions. Side effects can include infusion reactions, severe drug rashes including mucocutaneous reactions, reactivation of latent hepatitis and other infections and rarely progressive multifocal leukoencephalopathy.  All of the patient's questions and concerns were addressed. Infliximab Counseling:  I discussed with the patient the risks of infliximab including but not limited to myelosuppression, immunosuppression, autoimmune hepatitis, demyelinating diseases, lymphoma, and serious infections.  The patient understands that monitoring is required including a PPD at baseline and must alert us or the primary physician if symptoms of infection or other concerning signs are noted. Cellcept Counseling:  I discussed with the patient the risks of mycophenolate mofetil including but not limited to infection/immunosuppression, GI upset, hypokalemia, hypercholesterolemia, bone marrow suppression, lymphoproliferative disorders, malignancy, GI ulceration/bleed/perforation, colitis, interstitial lung disease, kidney failure, progressive multifocal leukoencephalopathy, and birth defects.  The patient understands that monitoring is required including a baseline creatinine and regular CBC testing. In addition, patient must alert us immediately if symptoms of infection or other concerning signs are noted. Metronidazole Counseling:  I discussed with the patient the risks of metronidazole including but not limited to seizures, nausea/vomiting, a metallic taste in the mouth, nausea/vomiting and severe allergy. Cyclophosphamide Pregnancy And Lactation Text: This medication is Pregnancy Category D and it isn't considered safe during pregnancy. This medication is excreted in breast milk. Zyclara Counseling:  I discussed with the patient the risks of imiquimod including but not limited to erythema, scaling, itching, weeping, crusting, and pain.  Patient understands that the inflammatory response to imiquimod is variable from person to person and was educated regarded proper titration schedule.  If flu-like symptoms develop, patient knows to discontinue the medication and contact us. Quinolones Counseling:  I discussed with the patient the risks of fluoroquinolones including but not limited to GI upset, allergic reaction, drug rash, diarrhea, dizziness, photosensitivity, yeast infections, liver function test abnormalities, tendonitis/tendon rupture. Ketoconazole Counseling:   Patient counseled regarding improving absorption with orange juice.  Adverse effects include but are not limited to breast enlargement, headache, diarrhea, nausea, upset stomach, liver function test abnormalities, taste disturbance, and stomach pain.  There is a rare possibility of liver failure that can occur when taking ketoconazole. The patient understands that monitoring of LFTs may be required, especially at baseline. The patient verbalized understanding of the proper use and possible adverse effects of ketoconazole.  All of the patient's questions and concerns were addressed. Colchicine Pregnancy And Lactation Text: This medication is Pregnancy Category C and isn't considered safe during pregnancy. It is excreted in breast milk. Clindamycin Pregnancy And Lactation Text: This medication can be used in pregnancy if certain situations. Clindamycin is also present in breast milk. Bexarotene Counseling:  I discussed with the patient the risks of bexarotene including but not limited to hair loss, dry lips/skin/eyes, liver abnormalities, hyperlipidemia, pancreatitis, depression/suicidal ideation, photosensitivity, drug rash/allergic reactions, hypothyroidism, anemia, leukopenia, infection, cataracts, and teratogenicity.  Patient understands that they will need regular blood tests to check lipid profile, liver function tests, white blood cell count, thyroid function tests and pregnancy test if applicable. Clindamycin Counseling: I counseled the patient regarding use of clindamycin as an antibiotic for prophylactic and/or therapeutic purposes. Clindamycin is active against numerous classes of bacteria, including skin bacteria. Side effects may include nausea, diarrhea, gastrointestinal upset, rash, hives, yeast infections, and in rare cases, colitis. Humira Counseling:  I discussed with the patient the risks of adalimumab including but not limited to myelosuppression, immunosuppression, autoimmune hepatitis, demyelinating diseases, lymphoma, and serious infections.  The patient understands that monitoring is required including a PPD at baseline and must alert us or the primary physician if symptoms of infection or other concerning signs are noted. Picato Counseling:  I discussed with the patient the risks of Picato including but not limited to erythema, scaling, itching, weeping, crusting, and pain. Otezla Counseling: The side effects of Otezla were discussed with the patient, including but not limited to worsening or new depression, weight loss, diarrhea, nausea, upper respiratory tract infection, and headache. Patient instructed to call the office should any adverse effect occur.  The patient verbalized understanding of the proper use and possible adverse effects of Otezla.  All the patient's questions and concerns were addressed. Erythromycin Counseling:  I discussed with the patient the risks of erythromycin including but not limited to GI upset, allergic reaction, drug rash, diarrhea, increase in liver enzymes, and yeast infections. Stelara Counseling:  I discussed with the patient the risks of ustekinumab including but not limited to immunosuppression, malignancy, posterior leukoencephalopathy syndrome, and serious infections.  The patient understands that monitoring is required including a PPD at baseline and must alert us or the primary physician if symptoms of infection or other concerning signs are noted. Tazorac Counseling:  Patient advised that medication is irritating and drying.  Patient may need to apply sparingly and wash off after an hour before eventually leaving it on overnight.  The patient verbalized understanding of the proper use and possible adverse effects of tazorac.  All of the patient's questions and concerns were addressed. Nsaids Pregnancy And Lactation Text: These medications are considered safe up to 30 weeks gestation. It is excreted in breast milk. Doxepin Pregnancy And Lactation Text: This medication is Pregnancy Category C and it isn't known if it is safe during pregnancy. It is also excreted in breast milk and breast feeding isn't recommended. Solaraze Pregnancy And Lactation Text: This medication is Pregnancy Category B and is considered safe. There is some data to suggest avoiding during the third trimester. It is unknown if this medication is excreted in breast milk. Valtrex Pregnancy And Lactation Text: this medication is Pregnancy Category B and is considered safe during pregnancy. This medication is not directly found in breast milk but it's metabolite acyclovir is present. Nsaids Counseling: NSAID Counseling: I discussed with the patient that NSAIDs should be taken with food. Prolonged use of NSAIDs can result in the development of stomach ulcers.  Patient advised to stop taking NSAIDs if abdominal pain occurs.  The patient verbalized understanding of the proper use and possible adverse effects of NSAIDs.  All of the patient's questions and concerns were addressed. Acitretin Counseling:  I discussed with the patient the risks of acitretin including but not limited to hair loss, dry lips/skin/eyes, liver damage, hyperlipidemia, depression/suicidal ideation, photosensitivity.  Serious rare side effects can include but are not limited to pancreatitis, pseudotumor cerebri, bony changes, clot formation/stroke/heart attack.  Patient understands that alcohol is contraindicated since it can result in liver toxicity and significantly prolong the elimination of the drug by many years. Cephalexin Counseling: I counseled the patient regarding use of cephalexin as an antibiotic for prophylactic and/or therapeutic purposes. Cephalexin (commonly prescribed under brand name Keflex) is a cephalosporin antibiotic which is active against numerous classes of bacteria, including most skin bacteria. Side effects may include nausea, diarrhea, gastrointestinal upset, rash, hives, yeast infections, and in rare cases, hepatitis, kidney disease, seizures, fever, confusion, neurologic symptoms, and others. Patients with severe allergies to penicillin medications are cautioned that there is about a 10% incidence of cross-reactivity with cephalosporins. When possible, patients with penicillin allergies should use alternatives to cephalosporins for antibiotic therapy. Hydroxychloroquine Pregnancy And Lactation Text: This medication has been shown to cause fetal harm but it isn't assigned a Pregnancy Risk Category. There are small amounts excreted in breast milk. Tazorac Pregnancy And Lactation Text: This medication is not safe during pregnancy. It is unknown if this medication is excreted in breast milk. Gabapentin Counseling: I discussed with the patient the risks of gabapentin including but not limited to dizziness, somnolence, fatigue and ataxia. Birth Control Pills Pregnancy And Lactation Text: This medication should be avoided if pregnant and for the first 30 days post-partum. Skyrizi Counseling: I discussed with the patient the risks of risankizumab-rzaa including but not limited to immunosuppression, and serious infections.  The patient understands that monitoring is required including a PPD at baseline and must alert us or the primary physician if symptoms of infection or other concerning signs are noted. Cosentyx Counseling:  I discussed with the patient the risks of Cosentyx including but not limited to worsening of Crohn's disease, immunosuppression, allergic reactions and infections.  The patient understands that monitoring is required including a PPD at baseline and must alert us or the primary physician if symptoms of infection or other concerning signs are noted. Sski Pregnancy And Lactation Text: This medication is Pregnancy Category D and isn't considered safe during pregnancy. It is excreted in breast milk. Topical Sulfur Applications Pregnancy And Lactation Text: This medication is Pregnancy Category C and has an unknown safety profile during pregnancy. It is unknown if this topical medication is excreted in breast milk. Spironolactone Counseling: Patient advised regarding risks of diarrhea, abdominal pain, hyperkalemia, birth defects (for female patients), liver toxicity and renal toxicity. The patient may need blood work to monitor liver and kidney function and potassium levels while on therapy. The patient verbalized understanding of the proper use and possible adverse effects of spironolactone.  All of the patient's questions and concerns were addressed. High Dose Vitamin A Counseling: Side effects reviewed, pt to contact office should one occur. Xolair Counseling:  Patient informed of potential adverse effects including but not limited to fever, muscle aches, rash and allergic reactions.  The patient verbalized understanding of the proper use and possible adverse effects of Xolair.  All of the patient's questions and concerns were addressed.

## 2023-01-29 NOTE — PROGRESS NOTE ADULT - PROBLEM SELECTOR PLAN 1
SIRS = leukocytosis + hypothermia + borderline tachycardia = pyuria may be sepsis from UTI  -S/p ceftriaxone x1  -Continue ceftriaxone   -Follow up urine culture SIRS = leukocytosis + hypothermia + borderline tachycardia = pyuria may be sepsis from UTI  -S/p ceftriaxone x1  - CXR clear  -Continue ceftriaxone  (1/29 - )  -f/u UCx, BCx (1/29), RVP

## 2023-01-29 NOTE — H&P ADULT - NSHPPHYSICALEXAM_GEN_ALL_CORE
Vital Signs Last 24 Hrs  T(C): 36.6 (29 Jan 2023 00:19), Max: 36.6 (29 Jan 2023 00:19)  T(F): 97.9 (29 Jan 2023 00:19), Max: 97.9 (29 Jan 2023 00:19)  HR: 80 (29 Jan 2023 00:19) (80 - 102)  BP: 119/71 (29 Jan 2023 00:19) (104/56 - 119/71)  BP(mean): --  RR: 16 (29 Jan 2023 00:19) (16 - 18)  SpO2: 100% (29 Jan 2023 00:19) (100% - 100%)    Parameters below as of 29 Jan 2023 00:19  Patient On (Oxygen Delivery Method): room air    PHYSICAL EXAM:  GENERAL: NAD, lying in bed comfortably  HEAD: Atraumatic, normocephalic appearing  EYES: EOMI, PERRLA, conjunctiva and sclera clear  ENT: Moist mucous membranes  NECK: Supple, No JVD; no palpable pre-auricular, post-auricular, occipital, mandibular, submental, supra-clavicular, or infra-clavicular lymph nodes   CHEST/LUNG: Clear to auscultation bilaterally; No rales, rhonchi, wheezing, or rubs. Unlabored respirations  HEART: Regular rate and rhythm; No murmurs, rubs, or gallops  ABDOMEN: Bowel sounds present; Soft, nontender to light and deep palpation, nondistended  EXTREMITIES:  2+ Peripheral radial pulses; brisk capillary refill. No clubbing, cyanosis, or bilateral LE edema  NERVOUS SYSTEM:  Alert & Oriented to situation, speech clear. No gross motor sensory deficits   MSK: FROM all 4 extremities, full and equal strength  PSYCH: Appropriate affect  SKIN: No obvious rashes or lesions Vital Signs Last 24 Hrs  T(C): 36.6 (29 Jan 2023 00:19), Max: 36.6 (29 Jan 2023 00:19)  T(F): 97.9 (29 Jan 2023 00:19), Max: 97.9 (29 Jan 2023 00:19)  HR: 80 (29 Jan 2023 00:19) (80 - 102)  BP: 119/71 (29 Jan 2023 00:19) (104/56 - 119/71)  BP(mean): --  RR: 16 (29 Jan 2023 00:19) (16 - 18)  SpO2: 100% (29 Jan 2023 00:19) (100% - 100%)    Parameters below as of 29 Jan 2023 00:19  Patient On (Oxygen Delivery Method): room air    PHYSICAL EXAM:  GENERAL: NAD, lying in bed comfortably  HEAD: Atraumatic, normocephalic appearing  EYES: Left ocular strabismus   NECK: Supple, No palpable pre-auricular, post-auricular, occipital, mandibular, submental, supra-clavicular, or infra-clavicular lymph nodes   CHEST/LUNG: Clear to auscultation in anterior lung fields; no obvious rales, rhonchi, wheezing, or rubs. Unlabored respirations  HEART: Regular rate and rhythm; No obvious murmurs  ABDOMEN: Soft, nontender to light and deep palpation, nondistended  EXTREMITIES:  No significant bilateral LE edema  NERVOUS SYSTEM:  A&Ox2-3 but with limitations from language barrier  PSYCH: Appropriate affect

## 2023-01-29 NOTE — PHYSICAL THERAPY INITIAL EVALUATION ADULT - ADDITIONAL COMMENTS
Pt is a poor historian; no Care coordinator note at this time. Information regarding pt's prior level of function needs to be verified. As per pt she lives alone in a private house. Prior to hospital admission pt was completely independent and used no assistive device with ambulation. Pt denies any recent falls.    Pt left comfortable on stretcher, NAD, all lines intact, all precautions maintained, and RN aware of PT evaluation.

## 2023-01-29 NOTE — ED ADULT NURSE REASSESSMENT NOTE - NS ED NURSE REASSESS COMMENT FT1
[facility] Patient resting on stretcher, respirations even and unlabored. Vitals stable, remains on cardiac monitor-NS.

## 2023-01-29 NOTE — H&P ADULT - NSHPLABSRESULTS_GEN_ALL_CORE
LABS:                        14.0   19.21 )-----------( 350      ( 28 Jan 2023 20:23 )             43.7     01-28    137  |  106  |  40<H>  ----------------------------<  139<H>  5.7<H>   |  14<L>  |  1.15    Ca    9.4      28 Jan 2023 20:23    TPro  7.1  /  Alb  4.0  /  TBili  0.6  /  DBili  x   /  AST  22  /  ALT  22  /  AlkPhos  86  01-28    PT/INR - ( 28 Jan 2023 20:23 )   PT: 12.7 sec;   INR: 1.09 ratio         PTT - ( 28 Jan 2023 20:23 )  PTT:23.1 sec      Urinalysis Basic - ( 29 Jan 2023 00:17 )    Color: Yellow / Appearance: Clear / SG: >1.050 / pH: x  Gluc: x / Ketone: Small  / Bili: Negative / Urobili: <2 mg/dL   Blood: x / Protein: Trace / Nitrite: Negative   Leuk Esterase: Moderate / RBC: 1 /HPF / WBC 17 /HPF   Sq Epi: x / Non Sq Epi: 2 /HPF / Bacteria: Negative LABS:                        14.0   19.21 )-----------( 350      ( 28 Jan 2023 20:23 )             43.7     01-28    137  |  106  |  40<H>  ----------------------------<  139<H>  5.7<H>   |  14<L>  |  1.15    Ca    9.4      28 Jan 2023 20:23    TPro  7.1  /  Alb  4.0  /  TBili  0.6  /  DBili  x   /  AST  22  /  ALT  22  /  AlkPhos  86  01-28    PT/INR - ( 28 Jan 2023 20:23 )   PT: 12.7 sec;   INR: 1.09 ratio         PTT - ( 28 Jan 2023 20:23 )  PTT:23.1 sec      Urinalysis Basic - ( 29 Jan 2023 00:17 )    Color: Yellow / Appearance: Clear / SG: >1.050 / pH: x  Gluc: x / Ketone: Small  / Bili: Negative / Urobili: <2 mg/dL   Blood: x / Protein: Trace / Nitrite: Negative   Leuk Esterase: Moderate / RBC: 1 /HPF / WBC 17 /HPF   Sq Epi: x / Non Sq Epi: 2 /HPF / Bacteria: Negative    EKG personally reviewed: NSR, no acute ischemic changes.

## 2023-01-29 NOTE — H&P ADULT - HISTORY OF PRESENT ILLNESS
73 yo F PMHx HTN, HLD, NIDDM, Presenting to ED for altered mental status, last known normal at 9 AM when leaving apartment to go to the store.  Patient subsequently found wandering approximately 30 minutes prior to ED arrival.  On scene patient appeared to be confused with Slovenian .  Patient currently living independently with family living complex nearby.  Patient's sister reported last known normal at 9 AM.  Patient ambulatory on EMS arrival where patient was found in street.  No history of stroke.  Patient with complaints of generalized weakness.  Presently requesting to return home.  Patient largely noncompliant with history taking.  Patient not taking anticoagulation per family.  Family denies other recent significant past medical history or recent infectious symptoms.  Patient denies pain in abdomen chest or head.  No history of similar episodes. CODE STROKE called. LIMITED HPI as patient's language or dialect could not be found or understood. Attempted to call family members multiple times but none answered. The following was largely obtained from chart note:  72 F PMH HTN, HLD, DMT2 non-insulin dependent, left eye strabismus, and mild intellectual disability presenting with reported altered mental status. As per chart note, her last known normal was at 9 AM as per her sister, when leaving apartment to go to a store. Patient subsequently found wandering approximately 30 minutes prior to ED arrival. On scene patient appeared to be confused with Kis[place] . Patient currently living independently with family living complex nearby. Patient was ambulating prior to EMS arrival where patient was found in street. No history of stroke. Patient says she was not feeling well as pains but does not specified.  Per chart, she reported generalized weakness. Patient not taking anticoagulation per family.  Family reported absence of other recent significant past medical history or recent infectious symptoms. No history of similar episodes. CODE STROKE called but without acute interventions. Currently, patient says that she has no chest pain or breathing issues, abd pain, or painful urination. Patient knows her name, knows that she is located in the hospital, and says she came to the hospital because she was unwell. However, [place]  Luis F (#[id number]) said it was difficult to follow patient and could not fully understand patient. Nurse Franca attempted to communicate in Kisw[place]ili but seems patient may be speaking a different dialect.    ED Course: Initially hypothermic 90F (unspecified how temp was obtained) but subsequently ~97.9F; HR initially ; -119/56-71; SpO2 100% on RA

## 2023-01-29 NOTE — ED ADULT NURSE REASSESSMENT NOTE - NS ED NURSE REASSESS COMMENT FT1
Break coverage RN: Pt A&Ox1 resting on stretcher. Straight cauterization performed per order. Sterility maintained. pt tolerated well. Urine collected per order. Respirations even and unlabored. Pt offers no complaints at this time. No acute distress noted. Pending urine results. Bed in [facility], safety maintained.

## 2023-01-29 NOTE — H&P ADULT - PROBLEM SELECTOR PLAN 5
History of per chart of mild mental disability  -May be contributing to language barrier  -Will have to reach out to family members during day for more info

## 2023-01-29 NOTE — PROGRESS NOTE ADULT - PROBLEM SELECTOR PLAN 5
History of per chart of mild mental disability  -May be contributing to language barrier  -Will have to reach out to family members during day for more info History of per chart of mild mental disability. AOx2 at baseline  - lives alone and is indepdent, however, sister lvies close by

## 2023-01-29 NOTE — PROVIDER CONTACT NOTE (OTHER) - ACTION/TREATMENT ORDERED:
As per provider Tish Gonzalez (TEAMS), no further interventions needed at this time. RN will continue to monitor.

## 2023-01-29 NOTE — PROGRESS NOTE ADULT - PROBLEM SELECTOR PLAN 2
Acute mental status change as per patient's sister without clear etiology. Ddx includes endogenous (infection, malignancy, stroke, tumor) vs exogenous causes (drugs vs medications).  -CTA brain protocol negative  -Neurology consulted and following  -Follow up UA, Utox, ETOH level, TSH, RPR,  vitamin B1, B12, folate, lactate, HIV  -Consider rest of neurology team recs for AMS workup (refer to note) as deemed appropriate RESOLVED - back to baseline AOx2. Acute mental status change as per patient's sister without clear etiology. Ddx includes endogenous (infection, malignancy, stroke, tumor) vs exogenous causes (drugs vs medications).  -CTA brain protocol negative  -Neurology consulted and following  -Follow up Utox, ETOH level, TSH, T3/T4, RPR, antithyroglobulin Ab, TPO Ab, vitamin B1, B6, B12, folate, homocysteine, methylmalonic acid, lactate, creatnine kinase, ammonia, Cu, ceruloplasmin, SPEP, HIV, ESR, CRP, Zn

## 2023-01-29 NOTE — PROGRESS NOTE ADULT - ATTENDING COMMENTS
71 y/o woman with a PMHx significant for HTN, HLD, DM2 p/w AMS and speech difficulty likely 2/2 metabolic encephalopathy in the setting of sepsis vs. other metabolic derangement    #Metabolic Encephalopathy  -Unclear baseline. AAO x 2 on assessment today  -Pt unable to comprehend/communicate w/  (Syrian  [id number]). Follows some commands in English. Listed family members (Kate and Ambrocio) contacted. Did not  the phone, Voicemail left.  -At this time etiology for metabolic encephalopathy is uncertain. Possibly sepsis in setting of ?UTI though UA only w/. LEs/ WBCs  -Work up and mgt of sepsis as below  -CT Head/ CTA head and neck unremarkable   -Obtain Utox, ETOH level, TSH, T3/T4, RPR, antithyroglobulin Ab, TPO Ab, vitamin B1, B6, B12, folate, homocysteine, methylmalonic acid, lactate, creatnine kinase, ammonia, Cu, ceruloplasmin, SPEP, HIV, ESR, CRP, Zn  -Neuro on board, appreciate recs     #Sepsis  -Unclear etiology. Possible in setting of ?UTI though UA only w/. LEs/ WBCs  -C/w CTX  -CXR w/o focal consolidations   -Obtain RVP  -Obtain blood Cx x 2  -F/u urine Cx  -Trend leukocytosis- downtrending      DVT: Lovenox  DIET: CC diet  DISPO: Pending hospital course

## 2023-01-29 NOTE — H&P ADULT - PROBLEM SELECTOR PLAN 2
SIRS = leukocytosis + hypothermia + borderline tachycardia = pyuria may be sepsis from UTI  -S/p ceftriaxone x1  -Continue ceftriaxone   -Follow up urine culture Acute mental status change as per patient's sister without clear etiology. Ddx includes endogenous (infection, malignancy, stroke, tumor) vs exogenous causes (drugs vs medications).  -CTA brain protocol negative  -Neurology consulted and following recs appreciated  -Follow up UA, Utox, ETOH level, TSH, RPR,  vitamin B1, B12, folate, lactate, creatinine kinase, ammonia, HIV Acute mental status change as per patient's sister without clear etiology. Ddx includes endogenous (infection, malignancy, stroke, tumor) vs exogenous causes (drugs vs medications).  -CTA brain protocol negative  -Neurology consulted and following  -Follow up UA, Utox, ETOH level, TSH, RPR,  vitamin B1, B12, folate, lactate, HIV  -Consider rest of neurology team recs for AMS workup (refer to note) as deemed appropriate

## 2023-01-29 NOTE — PATIENT PROFILE ADULT - BILL PAYMENT
Hearing protection.    Recheck one year for cleaning and audiogram unless symptoms, change, problems prior.    
no

## 2023-01-29 NOTE — H&P ADULT - PROBLEM SELECTOR PLAN 1
Acute mental status change as per patient's sister without clear etiology. Ddx includes endogenous (infection, malignancy, stroke, tumor) vs exogenous causes (drugs vs medications).  -CTA brain protocol negative  -Neurology consulted and following recs appreciated  -Follow up UA, Utox, ETOH level, TSH, T3/T4, RPR, antithyroglobulin Ab, TPO Ab, vitamin B1, B6, B12, folate, homocysteine, methylmalonic acid, lactate, creatnine kinase, ammonia, Cu, ceruloplasmin, SPEP, HIV, ESR, CRP, Zn SIRS = leukocytosis + hypothermia + borderline tachycardia = pyuria may be sepsis from UTI  -S/p ceftriaxone x1  -Continue ceftriaxone   -Follow up urine culture

## 2023-01-29 NOTE — H&P ADULT - ATTENDING COMMENTS
Pt is a 72 F PMH HTN, HLD, DMT2 non-insulin dependent, left eye strabismus, and mild intellectual disability presenting with episode of altered mental status found to be by EMS wandering the streets and brought in for further evaluation. Pt s/p stroke code in ED - CTH, CTA head and neck w/o acute pathology. Pt admitted for acute metabolic encephalopathy likely 2/2 sepsis from UTI. Attempted to interview pt with Sudanese  at bedside however pt refusing to participate and wanting to sleep. Hx obtained from chart review and resident interview - concern for language barrier, unable to reach family overnight. Plan to c/w IV ceftriaxone, f/u urine cx. Obtain further collateral from family in AM. Appreciate neuro recs.

## 2023-01-29 NOTE — H&P ADULT - PROBLEM SELECTOR PLAN 7
DVT PPx: lovenox 40mg qd  Dispo: pending clinical improvement DVT PPx: Lovenox 40mg qd  PT Consult placed  Dispo: pending clinical improvement

## 2023-01-29 NOTE — PHYSICAL THERAPY INITIAL EVALUATION ADULT - PERTINENT HX OF CURRENT PROBLEM, REHAB EVAL
72 F PMH HTN, HLD, DMT2 non-insulin dependent, left eye strabismus, and mild intellectual disability presenting with reported altered mental status with unclear etiology. Course complicated by code stroke without acute interventions with unremarkable CTA head imaging as per stroke protocol. Found to have leukocytosis, hypothermia, borderline, tachycardia with pyuria concerning for acute encephalopathy from sepsis from suspected UTI.

## 2023-01-29 NOTE — PATIENT PROFILE ADULT - FALL HARM RISK - RISK INTERVENTIONS

## 2023-01-29 NOTE — PHYSICAL THERAPY INITIAL EVALUATION ADULT - PATIENT PROFILE REVIEW, REHAB EVAL
No Active Activity Order in the Computer; Spoke with TACHO Bhagat prior to PT evaluation--> Pt OK for PT consult./yes

## 2023-01-29 NOTE — PHYSICAL THERAPY INITIAL EVALUATION ADULT - GENERAL OBSERVATIONS, REHAB EVAL
Pt encountered in semisupine position on stretcher in the ED, no distress, AxOx2, with +IV. Pt agreeable to participate in PT evaluation.

## 2023-01-29 NOTE — H&P ADULT - PROBLEM SELECTOR PLAN 4
Hold of BP meds because of presumed sepsis and need for med reconciliation Hold off of BP meds because of presumed sepsis and need for med reconciliation

## 2023-01-29 NOTE — H&P ADULT - PROBLEM SELECTOR PLAN 3
History of DMT2 with outpatient prescription filled for metformin 500mg bid and Januvia 100mg qd.   -Placed on low insulin sliding scale  -Diabetic diet  -Needs medication reconciliation during day and may difficult to obtain given patient is independent and has language barrier

## 2023-01-30 ENCOUNTER — TRANSCRIPTION ENCOUNTER (OUTPATIENT)
Age: 73
End: 2023-01-30

## 2023-01-30 VITALS
RESPIRATION RATE: 17 BRPM | HEART RATE: 99 BPM | OXYGEN SATURATION: 99 % | SYSTOLIC BLOOD PRESSURE: 127 MMHG | DIASTOLIC BLOOD PRESSURE: 74 MMHG | TEMPERATURE: 98 F

## 2023-01-30 LAB
% ALBUMIN: 47.5 % — SIGNIFICANT CHANGE UP
% ALPHA 1: 3.9 % — SIGNIFICANT CHANGE UP
% ALPHA 2: 14.9 % — SIGNIFICANT CHANGE UP
% BETA: 14.3 % — SIGNIFICANT CHANGE UP
% GAMMA: 19.4 % — SIGNIFICANT CHANGE UP
A1C WITH ESTIMATED AVERAGE GLUCOSE RESULT: 5.7 % — HIGH (ref 4–5.6)
ALBUMIN SERPL ELPH-MCNC: 2.8 G/DL — LOW (ref 3.3–4.4)
ALBUMIN SERPL ELPH-MCNC: 3.6 G/DL — SIGNIFICANT CHANGE UP (ref 3.3–5)
ALBUMIN/GLOB SERPL ELPH: 0.9 RATIO — SIGNIFICANT CHANGE UP
ALP SERPL-CCNC: 70 U/L — SIGNIFICANT CHANGE UP (ref 40–120)
ALPHA1 GLOB SERPL ELPH-MCNC: 0.23 G/DL — SIGNIFICANT CHANGE UP (ref 0.1–0.3)
ALPHA2 GLOB SERPL ELPH-MCNC: 0.9 G/DL — SIGNIFICANT CHANGE UP (ref 0.6–1)
ALT FLD-CCNC: 15 U/L — SIGNIFICANT CHANGE UP (ref 4–33)
ANION GAP SERPL CALC-SCNC: 11 MMOL/L — SIGNIFICANT CHANGE UP (ref 7–14)
AST SERPL-CCNC: 22 U/L — SIGNIFICANT CHANGE UP (ref 4–32)
B-GLOBULIN SERPL ELPH-MCNC: 0.84 G/DL — SIGNIFICANT CHANGE UP (ref 0.6–1.1)
BASE EXCESS BLDV CALC-SCNC: -4.1 MMOL/L — LOW (ref -2–3)
BASOPHILS # BLD AUTO: 0.03 K/UL — SIGNIFICANT CHANGE UP (ref 0–0.2)
BASOPHILS NFR BLD AUTO: 0.3 % — SIGNIFICANT CHANGE UP (ref 0–2)
BILIRUB SERPL-MCNC: 0.5 MG/DL — SIGNIFICANT CHANGE UP (ref 0.2–1.2)
BLOOD GAS VENOUS COMPREHENSIVE RESULT: SIGNIFICANT CHANGE UP
BUN SERPL-MCNC: 31 MG/DL — HIGH (ref 7–23)
CALCIUM SERPL-MCNC: 8.8 MG/DL — SIGNIFICANT CHANGE UP (ref 8.4–10.5)
CERULOPLASMIN SERPL-MCNC: 19 MG/DL — SIGNIFICANT CHANGE UP (ref 16–45)
CHLORIDE BLDV-SCNC: 109 MMOL/L — HIGH (ref 96–108)
CHLORIDE SERPL-SCNC: 110 MMOL/L — HIGH (ref 98–107)
CK SERPL-CCNC: 198 U/L — HIGH (ref 25–170)
CO2 BLDV-SCNC: 22.9 MMOL/L — SIGNIFICANT CHANGE UP (ref 22–26)
CO2 SERPL-SCNC: 21 MMOL/L — LOW (ref 22–31)
CREAT SERPL-MCNC: 0.83 MG/DL — SIGNIFICANT CHANGE UP (ref 0.5–1.3)
CRP SERPL-MCNC: 15.2 MG/L — HIGH
CULTURE RESULTS: NO GROWTH — SIGNIFICANT CHANGE UP
EGFR: 74 ML/MIN/1.73M2 — SIGNIFICANT CHANGE UP
EOSINOPHIL # BLD AUTO: 0.12 K/UL — SIGNIFICANT CHANGE UP (ref 0–0.5)
EOSINOPHIL NFR BLD AUTO: 1.4 % — SIGNIFICANT CHANGE UP (ref 0–6)
ERYTHROCYTE [SEDIMENTATION RATE] IN BLOOD: 12 MM/HR — SIGNIFICANT CHANGE UP (ref 4–25)
ESTIMATED AVERAGE GLUCOSE: 117 — SIGNIFICANT CHANGE UP
GAMMA GLOBULIN: 1.14 G/DL — SIGNIFICANT CHANGE UP (ref 0.7–1.7)
GAS PNL BLDV: 137 MMOL/L — SIGNIFICANT CHANGE UP (ref 136–145)
GAS PNL BLDV: SIGNIFICANT CHANGE UP
GLUCOSE BLDC GLUCOMTR-MCNC: 78 MG/DL — SIGNIFICANT CHANGE UP (ref 70–99)
GLUCOSE BLDC GLUCOMTR-MCNC: 89 MG/DL — SIGNIFICANT CHANGE UP (ref 70–99)
GLUCOSE BLDV-MCNC: 90 MG/DL — SIGNIFICANT CHANGE UP (ref 70–99)
GLUCOSE SERPL-MCNC: 90 MG/DL — SIGNIFICANT CHANGE UP (ref 70–99)
HCO3 BLDV-SCNC: 22 MMOL/L — SIGNIFICANT CHANGE UP (ref 22–29)
HCT VFR BLD CALC: 38.2 % — SIGNIFICANT CHANGE UP (ref 34.5–45)
HCT VFR BLDA CALC: 48 % — HIGH (ref 34.5–46.5)
HCYS SERPL-MCNC: 9.7 UMOL/L — SIGNIFICANT CHANGE UP
HGB BLD CALC-MCNC: 15.9 G/DL — HIGH (ref 11.5–15.5)
HGB BLD-MCNC: 12.2 G/DL — SIGNIFICANT CHANGE UP (ref 11.5–15.5)
IANC: 6.03 K/UL — SIGNIFICANT CHANGE UP (ref 1.8–7.4)
IMM GRANULOCYTES NFR BLD AUTO: 0.2 % — SIGNIFICANT CHANGE UP (ref 0–0.9)
LACTATE BLDV-MCNC: 1.3 MMOL/L — SIGNIFICANT CHANGE UP (ref 0.5–2)
LYMPHOCYTES # BLD AUTO: 1.85 K/UL — SIGNIFICANT CHANGE UP (ref 1–3.3)
LYMPHOCYTES # BLD AUTO: 21.2 % — SIGNIFICANT CHANGE UP (ref 13–44)
MAGNESIUM SERPL-MCNC: 2 MG/DL — SIGNIFICANT CHANGE UP (ref 1.6–2.6)
MCHC RBC-ENTMCNC: 29.3 PG — SIGNIFICANT CHANGE UP (ref 27–34)
MCHC RBC-ENTMCNC: 31.9 GM/DL — LOW (ref 32–36)
MCV RBC AUTO: 91.8 FL — SIGNIFICANT CHANGE UP (ref 80–100)
MONOCYTES # BLD AUTO: 0.67 K/UL — SIGNIFICANT CHANGE UP (ref 0–0.9)
MONOCYTES NFR BLD AUTO: 7.7 % — SIGNIFICANT CHANGE UP (ref 2–14)
NEUTROPHILS # BLD AUTO: 6.03 K/UL — SIGNIFICANT CHANGE UP (ref 1.8–7.4)
NEUTROPHILS NFR BLD AUTO: 69.2 % — SIGNIFICANT CHANGE UP (ref 43–77)
NRBC # BLD: 0 /100 WBCS — SIGNIFICANT CHANGE UP (ref 0–0)
NRBC # FLD: 0 K/UL — SIGNIFICANT CHANGE UP (ref 0–0)
PCO2 BLDV: 41 MMHG — SIGNIFICANT CHANGE UP (ref 39–42)
PH BLDV: 7.33 — SIGNIFICANT CHANGE UP (ref 7.32–7.43)
PHOSPHATE SERPL-MCNC: 2.6 MG/DL — SIGNIFICANT CHANGE UP (ref 2.5–4.5)
PLATELET # BLD AUTO: 268 K/UL — SIGNIFICANT CHANGE UP (ref 150–400)
PO2 BLDV: 53 MMHG — SIGNIFICANT CHANGE UP
POTASSIUM BLDV-SCNC: 4 MMOL/L — SIGNIFICANT CHANGE UP (ref 3.5–5.1)
POTASSIUM SERPL-MCNC: 4.1 MMOL/L — SIGNIFICANT CHANGE UP (ref 3.5–5.3)
POTASSIUM SERPL-SCNC: 4.1 MMOL/L — SIGNIFICANT CHANGE UP (ref 3.5–5.3)
PROT PATTERN SERPL ELPH-IMP: SIGNIFICANT CHANGE UP
PROT SERPL-MCNC: 5.9 G/DL — SIGNIFICANT CHANGE UP
PROT SERPL-MCNC: 6.4 G/DL — SIGNIFICANT CHANGE UP (ref 6–8.3)
PROT SERPL-MCNC: 6.4 G/DL — SIGNIFICANT CHANGE UP (ref 6–8.3)
RBC # BLD: 4.16 M/UL — SIGNIFICANT CHANGE UP (ref 3.8–5.2)
RBC # FLD: 13.7 % — SIGNIFICANT CHANGE UP (ref 10.3–14.5)
SAO2 % BLDV: 85.3 % — SIGNIFICANT CHANGE UP
SODIUM SERPL-SCNC: 142 MMOL/L — SIGNIFICANT CHANGE UP (ref 135–145)
SPECIMEN SOURCE: SIGNIFICANT CHANGE UP
T PALLIDUM AB TITR SER: NEGATIVE — SIGNIFICANT CHANGE UP
THYROGLOB AB FLD IA-ACNC: <20 IU/ML — SIGNIFICANT CHANGE UP
THYROGLOB AB SERPL-ACNC: <20 IU/ML — SIGNIFICANT CHANGE UP
THYROPEROXIDASE AB SERPL-ACNC: 17.5 IU/ML — SIGNIFICANT CHANGE UP
THYROPEROXIDASE AB SERPL-ACNC: <10 IU/ML — SIGNIFICANT CHANGE UP
THYROPEROXIDASE AB SERPL-ACNC: <10 IU/ML — SIGNIFICANT CHANGE UP
WBC # BLD: 8.72 K/UL — SIGNIFICANT CHANGE UP (ref 3.8–10.5)
WBC # FLD AUTO: 8.72 K/UL — SIGNIFICANT CHANGE UP (ref 3.8–10.5)

## 2023-01-30 PROCEDURE — 84165 PROTEIN E-PHORESIS SERUM: CPT | Mod: 26

## 2023-01-30 PROCEDURE — 99238 HOSP IP/OBS DSCHRG MGMT 30/<: CPT

## 2023-01-30 RX ORDER — METOPROLOL TARTRATE 50 MG
1 TABLET ORAL
Qty: 0 | Refills: 0 | DISCHARGE

## 2023-01-30 RX ORDER — LISINOPRIL 2.5 MG/1
1 TABLET ORAL
Qty: 0 | Refills: 0 | DISCHARGE

## 2023-01-30 RX ADMIN — Medication 81 MILLIGRAM(S): at 12:54

## 2023-01-30 RX ADMIN — CEFTRIAXONE 100 MILLIGRAM(S): 500 INJECTION, POWDER, FOR SOLUTION INTRAMUSCULAR; INTRAVENOUS at 00:22

## 2023-01-30 NOTE — DISCHARGE NOTE NURSING/CASE MANAGEMENT/SOCIAL WORK - NSDCPEFALRISK_GEN_ALL_CORE
For information on Fall & Injury Prevention, visit: [email or web address] OR  [email or web address] OR  https://www.cdc.gov/steadi/patient.html

## 2023-01-30 NOTE — PROGRESS NOTE ADULT - PROBLEM SELECTOR PLAN 2
RESOLVED - back to baseline AOx2. Acute mental status change as per patient's sister without clear etiology. Ddx includes endogenous (infection, malignancy, stroke, tumor) vs exogenous causes (drugs vs medications).  -CTA brain protocol negative  -Neurology consulted and following  -Follow up Utox, ETOH level, TSH, T3/T4, RPR, antithyroglobulin Ab, TPO Ab, vitamin B1, B6, B12, folate, homocysteine, methylmalonic acid, lactate, creatnine kinase, ammonia, Cu, ceruloplasmin, SPEP, HIV, ESR, CRP, Zn

## 2023-01-30 NOTE — PROGRESS NOTE ADULT - PROBLEM SELECTOR PLAN 3
History of DMT2 with outpatient prescription filled for metformin 500mg bid and Januvia 100mg qd.   -Placed on low insulin sliding scale  -Diabetic diet  -Needs medication reconciliation during day and may difficult to obtain given patient is independent and has language barrier
History of DMT2 with outpatient prescription filled for metformin 500mg bid and Januvia 100mg qd.   -Placed on low insulin sliding scale  -Diabetic diet  -Needs medication reconciliation during day and may difficult to obtain given patient is independent and has language barrier

## 2023-01-30 NOTE — PROGRESS NOTE ADULT - SUBJECTIVE AND OBJECTIVE BOX
Neurology Progress    [id number]    HPI:  LIMITED HPI as patient's language or dialect could not be found or understood. Attempted to call family members multiple times but none answered. The following was largely obtained from chart note:  72 F PMH HTN, HLD, DMT2 non-insulin dependent, left eye strabismus, and mild intellectual disability presenting with reported altered mental status. As per chart note, her last known normal was at 9 AM as per her sister, when leaving apartment to go to a store. Patient subsequently found wandering approximately 30 minutes prior to ED arrival. On scene patient appeared to be confused with Ghanaian . Patient currently living independently with family living complex nearby. Patient was ambulating prior to EMS arrival where patient was found in street. No history of stroke. Patient says she was not feeling well as pains but does not specified.  Per chart, she reported generalized weakness. Patient not taking anticoagulation per family.  Family reported absence of other recent significant past medical history or recent infectious symptoms. No history of similar episodes. CODE STROKE called but without acute interventions. Currently, patient says that she has no chest pain or breathing issues, abd pain, or painful urination. Patient knows her name, knows that she is located in the hospital, and says she came to the hospital because she was unwell. However, Ghanaian  Luis F (#[id number]) said it was difficult to follow patient and could not fully understand patient. Nurse Franca attempted to communicate in Ghanaian but seems patient may be speaking a different dialect.    ED Course: Initially hypothermic 90F (unspecified how temp was obtained) but subsequently ~97.9F; HR initially ; -119/56-71; SpO2 100% on RA (29 Jan 2023 02:33)      Past Medical History  History of meningitis    Mild mental retardation    Cholelithiasis    Other diseases of tongue    Strabismus    Obesity        Past Surgical History  Hx of cholecystectomy    Cataract        MEDICATIONS    aspirin  chewable 81 milliGRAM(s) Oral daily  atorvastatin 20 milliGRAM(s) Oral at bedtime  cefTRIAXone   IVPB 1000 milliGRAM(s) IV Intermittent every 24 hours  dextrose 5%. 1000 milliLiter(s) IV Continuous <Continuous>  dextrose 5%. 1000 milliLiter(s) IV Continuous <Continuous>  dextrose 50% Injectable 25 Gram(s) IV Push once  dextrose 50% Injectable 12.5 Gram(s) IV Push once  dextrose 50% Injectable 25 Gram(s) IV Push once  dextrose Oral Gel 15 Gram(s) Oral once PRN  glucagon  Injectable 1 milliGRAM(s) IntraMuscular once  insulin lispro (ADMELOG) corrective regimen sliding scale   SubCutaneous three times a day before meals  insulin lispro (ADMELOG) corrective regimen sliding scale   SubCutaneous at bedtime         Family history: No history of dementia, strokes, or seizures   FAMILY HISTORY:  FH: borderline diabetes    FH: HTN (hypertension) (Mother)      SOCIAL HISTORY -- No history of tobacco or alcohol use     Allergies    No Known Allergies    Intolerances            Vital Signs Last 24 Hrs  T(C): 36.8 (29 Jan 2023 09:30), Max: 36.8 (29 Jan 2023 09:30)  T(F): 98.3 (29 Jan 2023 09:30), Max: 98.3 (29 Jan 2023 09:30)  HR: 72 (29 Jan 2023 09:30) (72 - 102)  BP: 100/53 (29 Jan 2023 09:30) (100/53 - 119/71)  BP(mean): --  RR: 18 (29 Jan 2023 09:30) (16 - 19)  SpO2: 97% (29 Jan 2023 09:30) (97% - 100%)    Parameters below as of 29 Jan 2023 09:30  Patient On (Oxygen Delivery Method): room air            On Neurological Examination:    Mental Status - Patient is alert, awake, oriented X3. .   Follows commands well and able to answer questions appropriately. Mood and affect  normal  Follow simple commands able to repeat  able to name.  Speech - Fluent no Dysarthria  no  Aphasia                              Cranial Nerves - Extraocular muscle intact  JOSE Facial symmetry Tongue midline, CnV1to V3 intact gross hearing intact      Motor Exam -   Right upper  5/5 throughout  Left upper 5/5 throughtout  Right lower- 5/5 throughout  Left lower 5/5 throughout  Coordination -finger to nose intact  Muscle tone - is normal all over. No asymmetry is seen.      Sensory    Bilateral intact to light touch    Gait -  normal  no ataxia     GENERAL Exam:     Nontoxic , No Acute Distress   	  HEENT:  normocephalic, atraumatic  		  LUNGS:	Clear bilaterally  No Wheeze      VASCULAR: no carotid brui  	  HEART:	 Normal S1S2   No murmur RRR        	  MUSCULOSKELETAL: Normal Range of Motion  	   SKIN:      Normal   No Ecchymosis               LABS:  CBC Full  -  ( 29 Jan 2023 02:46 )  WBC Count : 12.14 K/uL  RBC Count : 4.03 M/uL  Hemoglobin : 12.0 g/dL  Hematocrit : 36.8 %  Platelet Count - Automated : 285 K/uL  Mean Cell Volume : 91.3 fL  Mean Cell Hemoglobin : 29.8 pg  Mean Cell Hemoglobin Concentration : 32.6 gm/dL  Auto Neutrophil # : 9.54 K/uL  Auto Lymphocyte # : 1.55 K/uL  Auto Monocyte # : 0.98 K/uL  Auto Eosinophil # : 0.00 K/uL  Auto Basophil # : 0.02 K/uL  Auto Neutrophil % : 78.5 %  Auto Lymphocyte % : 12.8 %  Auto Monocyte % : 8.1 %  Auto Eosinophil % : 0.0 %  Auto Basophil % : 0.2 %    Urinalysis Basic - ( 29 Jan 2023 00:17 )    Color: Yellow / Appearance: Clear / SG: >1.050 / pH: x  Gluc: x / Ketone: Small  / Bili: Negative / Urobili: <2 mg/dL   Blood: x / Protein: Trace / Nitrite: Negative   Leuk Esterase: Moderate / RBC: 1 /HPF / WBC 17 /HPF   Sq Epi: x / Non Sq Epi: 2 /HPF / Bacteria: Negative      01-29    136  |  106  |  33<H>  ----------------------------<  160<H>  4.6   |  18<L>  |  0.96    Ca    8.4      29 Jan 2023 02:46    TPro  6.0  /  Alb  3.4  /  TBili  0.5  /  DBili  x   /  AST  17  /  ALT  11  /  AlkPhos  66  01-29    Hemoglobin A1C:     LIVER FUNCTIONS - ( 29 Jan 2023 02:46 )  Alb: 3.4 g/dL / Pro: 6.0 g/dL / ALK PHOS: 66 U/L / ALT: 11 U/L / AST: 17 U/L / GGT: x           Vitamin B12 Vitamin B12, Serum: 584 pg/mL (01-29 @ 07:21)    PT/INR - ( 28 Jan 2023 20:23 )   PT: 12.7 sec;   INR: 1.09 ratio         PTT - ( 28 Jan 2023 20:23 )  PTT:23.1 sec      RADIOLOGY    EKG              [facility] 
Carlos Winston MD  PGY-3 Department of Internal Medicine  Available on Microsoft Teams      Patient is a 72y old  Female who presents with a chief complaint of AMS (30 Jan 2023 14:45)      OVERNIGHT EVENTS: No acute overnight events.    SUBJECTIVE: Pt seen and examined. Denies fevers, chills, CP, SOB, Abdominal pain, N/V, Constipation, Diarrhea    MEDICATIONS  (STANDING):  aspirin  chewable 81 milliGRAM(s) Oral daily  atorvastatin 20 milliGRAM(s) Oral at bedtime  cefTRIAXone   IVPB 1000 milliGRAM(s) IV Intermittent every 24 hours  dextrose 5%. 1000 milliLiter(s) (100 mL/Hr) IV Continuous <Continuous>  dextrose 5%. 1000 milliLiter(s) (50 mL/Hr) IV Continuous <Continuous>  dextrose 50% Injectable 25 Gram(s) IV Push once  dextrose 50% Injectable 12.5 Gram(s) IV Push once  dextrose 50% Injectable 25 Gram(s) IV Push once  enoxaparin Injectable 40 milliGRAM(s) SubCutaneous every 24 hours  glucagon  Injectable 1 milliGRAM(s) IntraMuscular once  insulin lispro (ADMELOG) corrective regimen sliding scale   SubCutaneous three times a day before meals  insulin lispro (ADMELOG) corrective regimen sliding scale   SubCutaneous at bedtime    MEDICATIONS  (PRN):  dextrose Oral Gel 15 Gram(s) Oral once PRN Blood Glucose LESS THAN 70 milliGRAM(s)/deciliter      I&O's Summary      Vital Signs Last 24 Hrs  T(C): 36.8 (30 Jan 2023 13:57), Max: 36.8 (30 Jan 2023 13:57)  T(F): 98.2 (30 Jan 2023 13:57), Max: 98.2 (30 Jan 2023 13:57)  HR: 99 (30 Jan 2023 13:57) (67 - 100)  BP: 127/74 (30 Jan 2023 13:57) (100/60 - 127/84)  BP(mean): --  RR: 17 (30 Jan 2023 13:57) (14 - 18)  SpO2: 99% (30 Jan 2023 13:57) (98% - 100%)    Parameters below as of 30 Jan 2023 13:57  Patient On (Oxygen Delivery Method): room air        =================PHYSICAL EXAM=================    GENERAL: Laying comfortably, NAD  EYES: EOMI, PERRL, no scleral icterus  NECK: No JVD  LUNG: Clear to auscultation bilaterally; No wheeze, crackles or rhonci  HEART: Regular rate and rhythm; No murmurs, rubs, or gallops  ABDOMEN: Soft, Nontender, Nondistended  EXTREMITIES:  No LE edema, 2+ Peripheral Pulses, No clubbing, cyanosis, or edema  PSYCH: AAOx3  NEUROLOGY: non-focal, strength 5/5 in all extremities, sensation intact  SKIN: No rashes or lesions    =================================================    LABS:                        12.2   8.72  )-----------( 268      ( 30 Jan 2023 07:00 )             38.2     Auto Eosinophil # 0.12  / Auto Eosinophil % 1.4   / Auto Neutrophil # 6.03  / Auto Neutrophil % 69.2  / BANDS % x                            12.0   12.14 )-----------( 285      ( 29 Jan 2023 02:46 )             36.8     Auto Eosinophil # 0.00  / Auto Eosinophil % 0.0   / Auto Neutrophil # 9.54  / Auto Neutrophil % 78.5  / BANDS % x                            14.0   19.21 )-----------( 350      ( 28 Jan 2023 20:23 )             43.7     Auto Eosinophil # 0.00  / Auto Eosinophil % 0.0   / Auto Neutrophil # 17.18 / Auto Neutrophil % 89.4  / BANDS % x        01-30    142  |  110<H>  |  31<H>  ----------------------------<  90  4.1   |  21<L>  |  0.83  01-29    136  |  106  |  33<H>  ----------------------------<  160<H>  4.6   |  18<L>  |  0.96  01-28    137  |  106  |  40<H>  ----------------------------<  139<H>  5.7<H>   |  14<L>  |  1.15    Ca    8.8      30 Jan 2023 07:00  Mg     2.00     01-30  Phos  2.6     01-30  TPro  6.4  /  Alb  3.6  /  TBili  0.5  /  DBili  x   /  AST  22  /  ALT  15  /  AlkPhos  70  01-30  TPro  5.9<L>  /  Alb  x   /  TBili  x   /  DBili  x   /  AST  x   /  ALT  x   /  AlkPhos  x   01-29  TPro  6.0  /  Alb  3.4  /  TBili  0.5  /  DBili  x   /  AST  17  /  ALT  11  /  AlkPhos  66  01-29    PT/INR - ( 28 Jan 2023 20:23 )   PT: 12.7 sec;   INR: 1.09 ratio         PTT - ( 28 Jan 2023 20:23 )  PTT:23.1 sec  CARDIAC MARKERS ( 30 Jan 2023 07:00 )  x     / x     / 198 U/L / x     / x      CARDIAC MARKERS ( 29 Jan 2023 17:56 )  x     / x     / 262 U/L / x     / x      CARDIAC MARKERS ( 29 Jan 2023 02:46 )  x     / x     / 153 U/L / x     / x          Urinalysis Basic - ( 29 Jan 2023 00:17 )    Color: Yellow / Appearance: Clear / SG: >1.050 / pH: x  Gluc: x / Ketone: Small  / Bili: Negative / Urobili: <2 mg/dL   Blood: x / Protein: Trace / Nitrite: Negative   Leuk Esterase: Moderate / RBC: 1 /HPF / WBC 17 /HPF   Sq Epi: x / Non Sq Epi: 2 /HPF / Bacteria: Negative      Lactate, Blood: 0.9 mmol/L (01-29 @ 07:21)        RADIOLOGY & ADDITIONAL TESTS:    Imaging Personally Reviewed:    Consultant(s) Notes Reviewed:      Care Discussed with Consultants/Other Providers:  
Carlos Winston MD  PGY-3 Department of Internal Medicine  Available on Microsoft Teams      Patient is a 72y old  Female who presents with a chief complaint of AMS (29 Jan 2023 10:44)      OVERNIGHT EVENTS: No acute overnight events.    SUBJECTIVE: Pt seen and examined. Feels better and has no complaints at this time. Sister at bedside, patient wants to go home. Denies fevers, chills, CP, SOB, Abdominal pain, N/V, Constipation, Diarrhea    MEDICATIONS  (STANDING):  aspirin  chewable 81 milliGRAM(s) Oral daily  atorvastatin 20 milliGRAM(s) Oral at bedtime  cefTRIAXone   IVPB 1000 milliGRAM(s) IV Intermittent every 24 hours  dextrose 5%. 1000 milliLiter(s) (100 mL/Hr) IV Continuous <Continuous>  dextrose 5%. 1000 milliLiter(s) (50 mL/Hr) IV Continuous <Continuous>  dextrose 50% Injectable 25 Gram(s) IV Push once  dextrose 50% Injectable 12.5 Gram(s) IV Push once  dextrose 50% Injectable 25 Gram(s) IV Push once  enoxaparin Injectable 40 milliGRAM(s) SubCutaneous every 24 hours  glucagon  Injectable 1 milliGRAM(s) IntraMuscular once  insulin lispro (ADMELOG) corrective regimen sliding scale   SubCutaneous three times a day before meals  insulin lispro (ADMELOG) corrective regimen sliding scale   SubCutaneous at bedtime    MEDICATIONS  (PRN):  dextrose Oral Gel 15 Gram(s) Oral once PRN Blood Glucose LESS THAN 70 milliGRAM(s)/deciliter      I&O's Summary      Vital Signs Last 24 Hrs  T(C): 36.8 (29 Jan 2023 09:30), Max: 36.8 (29 Jan 2023 09:30)  T(F): 98.3 (29 Jan 2023 09:30), Max: 98.3 (29 Jan 2023 09:30)  HR: 72 (29 Jan 2023 09:30) (72 - 102)  BP: 100/53 (29 Jan 2023 09:30) (100/53 - 119/71)  BP(mean): --  RR: 18 (29 Jan 2023 09:30) (16 - 19)  SpO2: 97% (29 Jan 2023 09:30) (97% - 100%)    Parameters below as of 29 Jan 2023 09:30  Patient On (Oxygen Delivery Method): room air        =================PHYSICAL EXAM=================    GENERAL: Laying comfortably, NAD  EYES: EOMI, PERRL, no scleral icterus  NECK: No JVD  LUNG: Clear to auscultation bilaterally; No wheeze, crackles or rhonci  HEART: Regular rate and rhythm; No murmurs, rubs, or gallops  ABDOMEN: Soft, Nontender, Nondistended  EXTREMITIES:  No LE edema, 2+ Peripheral Pulses, No clubbing, cyanosis, or edema  PSYCH: AAOx2  NEUROLOGY: non-focal, strength 5/5 in all extremities, sensation intact  SKIN: No rashes or lesions    =================================================    LABS:                        12.0   12.14 )-----------( 285      ( 29 Jan 2023 02:46 )             36.8     Auto Eosinophil # 0.00  / Auto Eosinophil % 0.0   / Auto Neutrophil # 9.54  / Auto Neutrophil % 78.5  / BANDS % x                            14.0   19.21 )-----------( 350      ( 28 Jan 2023 20:23 )             43.7     Auto Eosinophil # 0.00  / Auto Eosinophil % 0.0   / Auto Neutrophil # 17.18 / Auto Neutrophil % 89.4  / BANDS % x        01-29    136  |  106  |  33<H>  ----------------------------<  160<H>  4.6   |  18<L>  |  0.96  01-28    137  |  106  |  40<H>  ----------------------------<  139<H>  5.7<H>   |  14<L>  |  1.15    Ca    8.4      29 Jan 2023 02:46  TPro  6.0  /  Alb  3.4  /  TBili  0.5  /  DBili  x   /  AST  17  /  ALT  11  /  AlkPhos  66  01-29  TPro  7.1  /  Alb  4.0  /  TBili  0.6  /  DBili  x   /  AST  22  /  ALT  22  /  AlkPhos  86  01-28    PT/INR - ( 28 Jan 2023 20:23 )   PT: 12.7 sec;   INR: 1.09 ratio         PTT - ( 28 Jan 2023 20:23 )  PTT:23.1 sec      Urinalysis Basic - ( 29 Jan 2023 00:17 )    Color: Yellow / Appearance: Clear / SG: >1.050 / pH: x  Gluc: x / Ketone: Small  / Bili: Negative / Urobili: <2 mg/dL   Blood: x / Protein: Trace / Nitrite: Negative   Leuk Esterase: Moderate / RBC: 1 /HPF / WBC 17 /HPF   Sq Epi: x / Non Sq Epi: 2 /HPF / Bacteria: Negative      Lactate, Blood: 0.9 mmol/L (01-29 @ 07:21)        RADIOLOGY & ADDITIONAL TESTS:    Imaging Personally Reviewed:    Consultant(s) Notes Reviewed:      Care Discussed with Consultants/Other Providers:

## 2023-01-30 NOTE — DISCHARGE NOTE PROVIDER - NSDCCPCAREPLAN_GEN_ALL_CORE_FT
PRINCIPAL DISCHARGE DIAGNOSIS  Diagnosis: Altered mental status  Assessment and Plan of Treatment: You came to the hospital because you were found to be confused. Your bloodwork shows that you were dehydrated and that you may have an infection. You were started on antibiotics and fluids and your symptoms Improved. A CT scan of your head was done and it was normal. The neurologists were consulted and recommended obtaining some blood work which was normal. Please follow up with your PCP 1-2 weeks after discharge. Please come back to the hospital if you have any fevers, chills, difficulty urinating, lethargy.  Your blood pressure medicaitons will be held on your discharge, please follow up with your PCP before restarting these medications

## 2023-01-30 NOTE — DISCHARGE NOTE PROVIDER - NSDCMRMEDTOKEN_GEN_ALL_CORE_FT
Januvia 100 mg oral tablet: 1 tab(s) orally once a day  lisinopril 40 mg oral tablet: 1 tab(s) orally once a day  metFORMIN 500 mg oral tablet: 1 tab(s) orally 2 times a day  rosuvastatin 5 mg oral capsule: 1 cap(s) orally once a day pm  Toprol-XL 25 mg oral tablet, extended release: 1 tab(s) orally once a day    Januvia 100 mg oral tablet: 1 tab(s) orally once a day  metFORMIN 500 mg oral tablet: 1 tab(s) orally 2 times a day  rosuvastatin 5 mg oral capsule: 1 cap(s) orally once a day pm

## 2023-01-30 NOTE — PROGRESS NOTE ADULT - PROBLEM SELECTOR PLAN 5
History of per chart of mild mental disability. AOx2 at baseline  - lives alone and is indepdent, however, sister lvies close by

## 2023-01-30 NOTE — DISCHARGE NOTE PROVIDER - HOSPITAL COURSE
72 F PMH HTN, HLD, DMT2 non-insulin dependent, left eye strabismus, and mild intellectual disability presenting with reported altered mental status i/s/o getting lost and wandering around her area  - AMS with unclear etiology. code stroke was called with unremarkable CT/CTA head and neck imaging as per stroke protocol. Found to have leukocytosis, hypothermia concerning for infection - UA with 17 WBC and mod LE but wit no bacteria or nitrates, UCx/BCx was drawn and patient was started on ceftraixone (1/29). Patient's UCx was NGTD and ceftriaxone was stopped after 2 doses. Lactate 3.4 and Cr 1.1 on admission which resolved with fluids and abx. Neurology was consulted for AMS workup an recommended lab workup - SERUM: TSH (0.84), B12 (584), folate (19.1), lactate (0.9), CK (153), HIV (-), ESR (12), CRP (10.2H)  --> PENDING: RPR, TG Ab, TPO Ab, SPEP, vitamin B1, MMA, homocysteine, Cu, ceruloplasmin, Zn, NH3     They recommended outpatient follow up. Patients AMS resolved a few hours after admission, ROS negative, wanting to go home. PT was consulted and recommended no PT needs. Given patient with no source of infection, will discharge patient with no further abx and with close PCP follow up in 1-2 weeks with Dr. Fredric Cogan. Patient is hemodynamically stable and ready for discharge to home.     Patients home BP meds will be held on discharge since her blood pressures have been normal here. Patient should follow up with PCP to restart these meds     72 F PMH HTN, HLD, DMT2 non-insulin dependent, left eye strabismus, and mild intellectual disability presenting with reported altered mental status i/s/o getting lost and wandering around her area  - AMS with unclear etiology. Patient at her baseline prior to leaving the home, reportedly took the wrong turn and got lost. Patient found wandering and then brought to the hospital.     In the ED, Code stroke was called with unremarkable CT/CTA head and neck imaging as per stroke protocol. Patient found to have leukocytosis, hypothermia concerning for infection - UA with 17 WBC and mod LE but without bacteria or nitrates, UCx/BCx was drawn and patient was started on ceftraixone (1/29). Patient's UCx was NGTD and ceftriaxone was stopped after 2 doses. Lactate 3.4 and Cr 1.1 on admission which resolved with fluids and abx. Neurology was consulted for AMS workup an recommended lab workup - SERUM: TSH (0.84), B12 (584), folate (19.1), lactate (0.9), CK (153), HIV (-), ESR (12), CRP (10.2H). The following labs are still pending at the time of discharge: RPR, TG Ab, TPO Ab, SPEP, vitamin B1, MMA, homocysteine, Cu, ceruloplasmin, Zn, NH3     Patient's mental status returned to baseline within a few hours of admission and requested discharge home. Patient was seen by Neurology who cleared the patient for discharge. Given patient has no clear source of AMS, she was advised to follow up with her PCP closely within 1-2 weeks with Dr. Fredric Cogan. Patient is hemodynamically stable and ready for discharge to home.     Patients home BP meds will be held on discharge since her blood pressures have been normal here. Patient should follow up with PCP to restart these meds

## 2023-01-30 NOTE — DISCHARGE NOTE NURSING/CASE MANAGEMENT/SOCIAL WORK - PATIENT PORTAL LINK FT
You can access the FollowMyHealth Patient Portal offered by [facility] by registering at the following website: [email or web address]. By joining [employer]’s FollowMyHealth portal, you will also be able to view your health information using other applications (apps) compatible with our system.

## 2023-01-30 NOTE — CHART NOTE - NSCHARTNOTEFT_GEN_A_CORE
72 y.o. Gabonese speaking woman with a PMH of HTN, HLD, NIDDM2, strabismus OS, and mild intellectual disability who presented to the ED on 1/28/23 with reported altered mental status, for which a stroke code was activated. CTH, CTA H/N & CTP unremarkable. Found to have leukocytosis, hypothermia, borderline tachycardia with pyuria concerning for acute encephalopathy from sepsis from suspected UTI. Started on empiric Ceftriaxone. Mental status now back to baseline.    Impression: Likely infectious encephalopathy due to UTI.    Recommendations:  -Continue treatment of UTI as per primary team  -Continue home aspirin 81mg daily  - SERUM: TSH (0.84), B12 (584), folate (19.1), lactate (0.9), CK (153), HIV (-), ESR (12), CRP (10.2H)  --> PENDING: RPR, TG Ab, TPO Ab, SPEP, vitamin B1, MMA, homocysteine, Cu, ceruloplasmin, Zn, NH3 – can be followed up, should not hold discharge.  -PT: no skilled needs  -No further inpatient workup required from a Neurological standpoint at this time.     Neurology signing off. Please [place] spectra at [phone] with any questions.    Thank you. 72 y.o. Iraqi speaking woman with a PMH of HTN, HLD, NIDDM2, strabismus OS, and mild intellectual disability who presented to the ED on 1/28/23 with reported altered mental status, for which a stroke code was activated. CTH, CTA H/N & CTP unremarkable. Found to have leukocytosis, hypothermia, borderline tachycardia with pyuria concerning for acute encephalopathy from sepsis from suspected UTI. Started on empiric Ceftriaxone. Mental status now back to baseline.    Impression: Likely infectious encephalopathy due to UTI.    Recommendations:  -Continue treatment of UTI as per primary team  -Continue home aspirin 81mg daily  - SERUM: TSH (0.84), B12 (584), folate (19.1), lactate (0.9), CK (153), HIV (-), ESR (12), CRP (10.2H)  --> PENDING: RPR, TG Ab, TPO Ab, SPEP, vitamin B1, MMA, homocysteine, Cu, ceruloplasmin, Zn, NH3 – can be followed up, should not hold discharge.  -PT: no skilled needs  -No further inpatient workup required from a Neurological standpoint at this time.     Neurology signing off. Please call spectra at [phone] with any questions    Thank you.

## 2023-01-30 NOTE — PROGRESS NOTE ADULT - PROBLEM SELECTOR PLAN 4
Hold off of BP meds because of presumed sepsis and need for med reconciliation
Hold off of BP meds because of presumed sepsis and need for med reconciliation

## 2023-01-30 NOTE — DISCHARGE NOTE PROVIDER - ATTENDING DISCHARGE PHYSICAL EXAMINATION:
[place]aced[place]  # [id number]   NAD, resting in bed, deferred questioning to sister. Sister reports patient is at her normal MS baseline. Is willing to take her to PCP for follow up.   CV: RRR, no m/r/rg   Resp: CTABL   Abdomen: Soft non-tender, non-distended   Ext: No LE edema    D/w sister and friend at bedside

## 2023-01-30 NOTE — PROGRESS NOTE ADULT - PROBLEM SELECTOR PLAN 1
SIRS = leukocytosis + hypothermia + borderline tachycardia = pyuria may be sepsis from UTI  -S/p ceftriaxone x1  - CXR clear  -Continue ceftriaxone  (1/29 - )  -UCx, NGTD, RVP negative

## 2023-02-01 LAB
% ALBUMIN: 46.5 % — SIGNIFICANT CHANGE UP
% ALPHA 1: 4 % — SIGNIFICANT CHANGE UP
% ALPHA 2: 15.4 % — SIGNIFICANT CHANGE UP
% BETA: 14.6 % — SIGNIFICANT CHANGE UP
% GAMMA: 19.5 % — SIGNIFICANT CHANGE UP
ALBUMIN SERPL ELPH-MCNC: 2.98 G/DL — LOW (ref 3.3–4.4)
ALBUMIN/GLOB SERPL ELPH: 0.9 RATIO — SIGNIFICANT CHANGE UP
ALPHA1 GLOB SERPL ELPH-MCNC: 0.26 G/DL — SIGNIFICANT CHANGE UP (ref 0.1–0.3)
ALPHA2 GLOB SERPL ELPH-MCNC: 1 G/DL — SIGNIFICANT CHANGE UP (ref 0.6–1)
B-GLOBULIN SERPL ELPH-MCNC: 0.93 G/DL — SIGNIFICANT CHANGE UP (ref 0.6–1.1)
COPPER SERPL-MCNC: 102 UG/DL — SIGNIFICANT CHANGE UP (ref 80–158)
COPPER SERPL-MCNC: 81 UG/DL — SIGNIFICANT CHANGE UP (ref 80–158)
GAMMA GLOBULIN: 1.25 G/DL — SIGNIFICANT CHANGE UP (ref 0.7–1.7)
PROT PATTERN SERPL ELPH-IMP: SIGNIFICANT CHANGE UP
PROT SERPL-MCNC: 6.4 G/DL — SIGNIFICANT CHANGE UP
VIT B1 SERPL-MCNC: 206.4 NMOL/L — HIGH (ref 66.5–200)
ZINC SERPL-MCNC: 50 UG/DL — SIGNIFICANT CHANGE UP (ref 44–115)

## 2023-02-02 LAB — ZINC SERPL-MCNC: 69 UG/DL — SIGNIFICANT CHANGE UP (ref 44–115)

## 2023-02-03 LAB
CULTURE RESULTS: SIGNIFICANT CHANGE UP
CULTURE RESULTS: SIGNIFICANT CHANGE UP
SPECIMEN SOURCE: SIGNIFICANT CHANGE UP
SPECIMEN SOURCE: SIGNIFICANT CHANGE UP
VIT B1 SERPL-MCNC: 183.1 NMOL/L — SIGNIFICANT CHANGE UP (ref 66.5–200)

## 2023-05-12 NOTE — PROGRESS NOTE ADULT - ASSESSMENT
72 F PMH HTN, HLD, DMT2 non-insulin dependent, left eye strabismus, and mild intellectual disability presenting with reported altered mental status. As per chart note, her last known normal was at 9 AM as per her sister, when leaving apartment to go to a store. Patient subsequently found wandering approximately 30 minutes prior to ED arrival. On scene patient appeared to be confused with Arabic .    Most likely toxic metabolic causes
72 F PMH HTN, HLD, DMT2 non-insulin dependent, left eye strabismus, and mild intellectual disability presenting with reported altered mental status with unclear etiology. Course complicated by code stroke without acute interventions with unremarkable CTA head imaging as per stroke protocol. Found to have leukocytosis, hypothermia, borderline, tachycardia with pyuria concerning for acute encephalopathy from sepsis from suspected UTI. 
72 F PMH HTN, HLD, DMT2 non-insulin dependent, left eye strabismus, and mild intellectual disability presenting with reported altered mental status with unclear etiology. Course complicated by code stroke without acute interventions with unremarkable CTA head imaging as per stroke protocol. Found to have leukocytosis, hypothermia, borderline, tachycardia with pyuria concerning for acute encephalopathy from sepsis from suspected UTI. 
Consent 2/Introductory Paragraph: Mohs surgery was explained to the patient and consent was obtained. The risks, benefits and alternatives to therapy were discussed in detail. Specifically, the risks of infection, scarring, bleeding, prolonged wound healing, incomplete removal, allergy to anesthesia, nerve injury and recurrence were addressed. Prior to the procedure, the treatment site was clearly identified and confirmed by the patient. All components of Universal Protocol/PAUSE Rule completed.

## 2024-05-07 NOTE — PATIENT PROFILE ADULT - NSPRESCRALCFREQ_GEN_A_NUR
Pt called to cancel 5/13 [facility] appt due to being in [facility] for rehab. 4/25-5/2 [facility] post fall. Notes three compression fractures of vertebrae. Dr. Roberts monitoring and managing INRs and warfarin while at [facility]. Pt anticipates being there 2-4 weeks. Will call [facility] when she is home to schedule next INR at [facility] post discharge.     
Never
